# Patient Record
Sex: MALE | Race: WHITE | NOT HISPANIC OR LATINO | Employment: OTHER | URBAN - NONMETROPOLITAN AREA
[De-identification: names, ages, dates, MRNs, and addresses within clinical notes are randomized per-mention and may not be internally consistent; named-entity substitution may affect disease eponyms.]

---

## 2018-04-09 ENCOUNTER — HISTORICAL (OUTPATIENT)
Dept: ADMINISTRATIVE | Facility: HOSPITAL | Age: 58
End: 2018-04-09

## 2020-12-10 ENCOUNTER — OFFICE VISIT (OUTPATIENT)
Dept: UROLOGY | Facility: CLINIC | Age: 60
End: 2020-12-10
Payer: MEDICAID

## 2020-12-10 VITALS
SYSTOLIC BLOOD PRESSURE: 157 MMHG | DIASTOLIC BLOOD PRESSURE: 96 MMHG | WEIGHT: 188 LBS | BODY MASS INDEX: 26.92 KG/M2 | HEIGHT: 70 IN | HEART RATE: 53 BPM

## 2020-12-10 DIAGNOSIS — R35.1 NOCTURIA: ICD-10-CM

## 2020-12-10 DIAGNOSIS — N52.9 ERECTILE DYSFUNCTION, UNSPECIFIED ERECTILE DYSFUNCTION TYPE: Primary | ICD-10-CM

## 2020-12-10 DIAGNOSIS — R68.82 DECREASED LIBIDO: ICD-10-CM

## 2020-12-10 PROCEDURE — 99203 OFFICE O/P NEW LOW 30 MIN: CPT | Mod: S$GLB,,, | Performed by: UROLOGY

## 2020-12-10 PROCEDURE — 99203 PR OFFICE/OUTPT VISIT, NEW, LEVL III, 30-44 MIN: ICD-10-PCS | Mod: S$GLB,,, | Performed by: UROLOGY

## 2020-12-10 RX ORDER — BUSPIRONE HYDROCHLORIDE 5 MG/1
5 TABLET ORAL 2 TIMES DAILY
COMMUNITY
Start: 2020-11-14 | End: 2022-09-20

## 2020-12-10 RX ORDER — ROSUVASTATIN CALCIUM 10 MG/1
10 TABLET, COATED ORAL DAILY
Status: ON HOLD | COMMUNITY
Start: 2020-11-23 | End: 2022-06-13 | Stop reason: HOSPADM

## 2020-12-10 RX ORDER — SILDENAFIL 100 MG/1
100 TABLET, FILM COATED ORAL DAILY PRN
Qty: 30 TABLET | Refills: 11 | Status: SHIPPED | OUTPATIENT
Start: 2020-12-10 | End: 2022-09-20

## 2020-12-10 RX ORDER — NEOMYCIN SULFATE, POLYMYXIN B SULFATE, AND DEXAMETHASONE 3.5; 10000; 1 MG/G; [USP'U]/G; MG/G
OINTMENT OPHTHALMIC
Status: ON HOLD | COMMUNITY
Start: 2020-12-02 | End: 2022-06-13 | Stop reason: HOSPADM

## 2020-12-10 RX ORDER — METOPROLOL SUCCINATE 25 MG/1
TABLET, EXTENDED RELEASE ORAL
Status: ON HOLD | COMMUNITY
Start: 2020-12-09 | End: 2022-06-13 | Stop reason: HOSPADM

## 2020-12-10 NOTE — PROGRESS NOTES
Subjective:       Patient ID: Esteban Martinez is a 60 y.o. male.    Chief Complaint: Decreased Libido      HPI:  60-year-old male self-referred for erectile dysfunction, decreased libido he reports only getting partial erections and not able to penetrate.    Past Medical History:   Past Medical History:   Diagnosis Date    Anxiety     Hyperlipidemia     Hypertension        Past Surgical Historical:   Past Surgical History:   Procedure Laterality Date    CHOLECYSTECTOMY      SHOULDER SURGERY Left         Medications:   Medication List with Changes/Refills   Current Medications    BUSPIRONE (BUSPAR) 5 MG TAB    Take 5 mg by mouth 2 (two) times daily.    METOPROLOL SUCCINATE (TOPROL-XL) 25 MG 24 HR TABLET        NEOMYCIN-POLYMYXIN-DEXAMETHASONE (DEXACINE) 3.5 MG/G-10,000 UNIT/G-0.1 % OINT    APPLY A THIN LAYER OF OINTMENT TO BOTH UPPER AND LOWER EYELIDS TWICE DAILY FOR 7 DAYS THEN ONCE DAILY FOR 7 DAYS    ROSUVASTATIN (CRESTOR) 10 MG TABLET    Take 10 mg by mouth once daily.        Past Social History:   Social History     Socioeconomic History    Marital status:      Spouse name: Not on file    Number of children: Not on file    Years of education: Not on file    Highest education level: Not on file   Occupational History    Not on file   Social Needs    Financial resource strain: Not on file    Food insecurity     Worry: Not on file     Inability: Not on file    Transportation needs     Medical: Not on file     Non-medical: Not on file   Tobacco Use    Smoking status: Never Smoker   Substance and Sexual Activity    Alcohol use: Never     Frequency: Never    Drug use: Never    Sexual activity: Yes   Lifestyle    Physical activity     Days per week: Not on file     Minutes per session: Not on file    Stress: Not on file   Relationships    Social connections     Talks on phone: Not on file     Gets together: Not on file     Attends Restoration service: Not on file     Active member of club or  organization: Not on file     Attends meetings of clubs or organizations: Not on file     Relationship status: Not on file   Other Topics Concern    Not on file   Social History Narrative    Not on file       Allergies:   Review of patient's allergies indicates:   Allergen Reactions    Codeine     Tramadol     Darvocet a500 [propoxyphene n-acetaminophen]     Tylenol [acetaminophen]         Family History: History reviewed. No pertinent family history.     Review of Systems:  Review of Systems   Constitutional: Negative for activity change and appetite change.   HENT: Negative for congestion and dental problem.    Respiratory: Negative for chest tightness and shortness of breath.    Cardiovascular: Negative for chest pain.   Gastrointestinal: Negative for abdominal distention and abdominal pain.   Endocrine: Negative for polyuria.   Genitourinary: Negative for difficulty urinating, discharge, dysuria, flank pain, frequency, hematuria, penile pain, penile swelling, scrotal swelling, testicular pain and urgency.   Musculoskeletal: Negative for back pain and neck pain.   Allergic/Immunologic: Positive for food allergies.   Neurological: Negative for dizziness.   Hematological: Negative for adenopathy.   Psychiatric/Behavioral: Negative for agitation, behavioral problems and confusion.       Physical Exam:  Physical Exam  Constitutional:       Appearance: He is well-developed.   HENT:      Head: Normocephalic and atraumatic.   Eyes:      Pupils: Pupils are equal, round, and reactive to light.   Cardiovascular:      Rate and Rhythm: Normal rate and regular rhythm.      Heart sounds: Normal heart sounds.   Pulmonary:      Effort: Pulmonary effort is normal. No respiratory distress.      Breath sounds: Normal breath sounds. No wheezing or rales.   Abdominal:      General: Bowel sounds are normal. There is no distension.      Palpations: Abdomen is soft.      Tenderness: There is no abdominal tenderness. There is no  guarding or rebound.   Genitourinary:     Penis: Normal. No tenderness.       Prostate: Normal.   Musculoskeletal: Normal range of motion.         Assessment/Plan:       Problem List Items Addressed This Visit     None      Visit Diagnoses     Erectile dysfunction, unspecified erectile dysfunction type    -  Primary    Nocturia        Decreased libido                 Decreased libido:  We had a long bety discussion about erectile dysfunction and the negative experiencing feelings associated with a sexual encounter with a poor outcome and how this can cause a person to want to avoid repeating that situation and that often times if the erectile dysfunction his fixed the interest in sex will increased patient understood this and feels that this is his issue this point we will avoid checking testosterone as his energy level and general desire for sex have not truly decreased.    Erectile dysfunction:  We will prescribe Viagra to be taken as needed.    Nocturia:  Patient was encouraged to decrease fluid intake for 4 hr prior to bedtime

## 2021-09-23 ENCOUNTER — HISTORICAL (OUTPATIENT)
Dept: SURGERY | Facility: HOSPITAL | Age: 61
End: 2021-09-23

## 2021-09-23 ENCOUNTER — HISTORICAL (OUTPATIENT)
Dept: LAB | Facility: HOSPITAL | Age: 61
End: 2021-09-23

## 2021-09-23 LAB
ABS NEUT (OLG): 4.4 X10(3)/MCL (ref 1.5–6.9)
ALBUMIN SERPL-MCNC: 4.1 GM/DL (ref 3.4–4.8)
ALBUMIN/GLOB SERPL: 1 RATIO (ref 1.1–2)
ALP SERPL-CCNC: 99 UNIT/L (ref 40–150)
ALT SERPL-CCNC: 37 UNIT/L (ref 0–55)
AST SERPL-CCNC: 31 UNIT/L (ref 5–34)
BASOPHILS # BLD AUTO: 0.1 X10(3)/MCL (ref 0–0.1)
BASOPHILS NFR BLD AUTO: 1 % (ref 0–1)
BILIRUB SERPL-MCNC: 0.9 MG/DL
BILIRUBIN DIRECT+TOT PNL SERPL-MCNC: 0.3 MG/DL (ref 0–0.5)
BILIRUBIN DIRECT+TOT PNL SERPL-MCNC: 0.6 MG/DL (ref 0–0.8)
BUN SERPL-MCNC: 15 MG/DL (ref 8.4–25.7)
CALCIUM SERPL-MCNC: 9.6 MG/DL (ref 8.8–10)
CHLORIDE SERPL-SCNC: 105 MMOL/L (ref 98–107)
CO2 SERPL-SCNC: 28 MMOL/L (ref 23–31)
CREAT SERPL-MCNC: 1.38 MG/DL (ref 0.73–1.18)
EOSINOPHIL # BLD AUTO: 0.4 X10(3)/MCL (ref 0–0.6)
EOSINOPHIL NFR BLD AUTO: 4 % (ref 0–5)
ERYTHROCYTE [DISTWIDTH] IN BLOOD BY AUTOMATED COUNT: 13.5 % (ref 11.5–17)
GLOBULIN SER-MCNC: 4 GM/DL (ref 2.4–3.5)
GLUCOSE SERPL-MCNC: 85 MG/DL (ref 82–115)
HCT VFR BLD AUTO: 47.2 % (ref 42–52)
HGB BLD-MCNC: 15.1 GM/DL (ref 14–18)
IMM GRANULOCYTES # BLD AUTO: 0.02 10*3/UL (ref 0–0.02)
IMM GRANULOCYTES NFR BLD AUTO: 0.3 % (ref 0–0.43)
LYMPHOCYTES # BLD AUTO: 2.2 X10(3)/MCL (ref 0.5–4.1)
LYMPHOCYTES NFR BLD AUTO: 28 % (ref 15–40)
MAGNESIUM SERPL-MCNC: 2 MG/DL (ref 1.6–2.6)
MCH RBC QN AUTO: 29 PG (ref 27–34)
MCHC RBC AUTO-ENTMCNC: 32 GM/DL (ref 31–36)
MCV RBC AUTO: 91 FL (ref 80–99)
MONOCYTES # BLD AUTO: 0.9 X10(3)/MCL (ref 0–1.1)
MONOCYTES NFR BLD AUTO: 11 % (ref 4–12)
NEUTROPHILS # BLD AUTO: 4.4 X10(3)/MCL (ref 1.5–6.9)
NEUTROPHILS NFR BLD AUTO: 56 % (ref 43–75)
PLATELET # BLD AUTO: 219 X10(3)/MCL (ref 140–400)
PMV BLD AUTO: 10.8 FL (ref 6.8–10)
POTASSIUM SERPL-SCNC: 4.5 MMOL/L (ref 3.5–5.1)
PROT SERPL-MCNC: 8.1 GM/DL (ref 5.8–7.6)
RBC # BLD AUTO: 5.19 X10(6)/MCL (ref 4.7–6.1)
SARS-COV-2 AG RESP QL IA.RAPID: NOT DETECTED
SODIUM SERPL-SCNC: 142 MMOL/L (ref 136–145)
WBC # SPEC AUTO: 8 X10(3)/MCL (ref 4.5–11.5)

## 2021-09-27 ENCOUNTER — HISTORICAL (OUTPATIENT)
Dept: ANESTHESIOLOGY | Facility: HOSPITAL | Age: 61
End: 2021-09-27

## 2021-09-27 ENCOUNTER — HISTORICAL (OUTPATIENT)
Dept: INFUSION THERAPY | Facility: HOSPITAL | Age: 61
End: 2021-09-27

## 2021-09-28 ENCOUNTER — HISTORICAL (OUTPATIENT)
Dept: INFUSION THERAPY | Facility: HOSPITAL | Age: 61
End: 2021-09-28

## 2021-09-30 ENCOUNTER — HISTORICAL (OUTPATIENT)
Dept: INFUSION THERAPY | Facility: HOSPITAL | Age: 61
End: 2021-09-30

## 2021-10-04 ENCOUNTER — HISTORICAL (OUTPATIENT)
Dept: LAB | Facility: HOSPITAL | Age: 61
End: 2021-10-04

## 2021-10-04 LAB
ABS NEUT (OLG): 6.7 X10(3)/MCL (ref 1.5–6.9)
ALBUMIN SERPL-MCNC: 4.1 GM/DL (ref 3.4–4.8)
ALBUMIN/GLOB SERPL: 1.1 RATIO (ref 1.1–2)
ALP SERPL-CCNC: 93 UNIT/L (ref 40–150)
ALT SERPL-CCNC: 34 UNIT/L (ref 0–55)
AST SERPL-CCNC: 24 UNIT/L (ref 5–34)
BASOPHILS # BLD AUTO: 0.1 X10(3)/MCL (ref 0–0.1)
BASOPHILS NFR BLD AUTO: 1 % (ref 0–1)
BILIRUB SERPL-MCNC: 1.6 MG/DL
BILIRUBIN DIRECT+TOT PNL SERPL-MCNC: 0.5 MG/DL (ref 0–0.5)
BILIRUBIN DIRECT+TOT PNL SERPL-MCNC: 1.1 MG/DL (ref 0–0.8)
BUN SERPL-MCNC: 18 MG/DL (ref 8.4–25.7)
CALCIUM SERPL-MCNC: 9.8 MG/DL (ref 8.8–10)
CHLORIDE SERPL-SCNC: 104 MMOL/L (ref 98–107)
CO2 SERPL-SCNC: 22 MMOL/L (ref 23–31)
CREAT SERPL-MCNC: 1.41 MG/DL (ref 0.73–1.18)
EOSINOPHIL # BLD AUTO: 0.2 X10(3)/MCL (ref 0–0.6)
EOSINOPHIL NFR BLD AUTO: 3 % (ref 0–5)
ERYTHROCYTE [DISTWIDTH] IN BLOOD BY AUTOMATED COUNT: 13.3 % (ref 11.5–17)
GLOBULIN SER-MCNC: 3.9 GM/DL (ref 2.4–3.5)
GLUCOSE SERPL-MCNC: 129 MG/DL (ref 82–115)
HCT VFR BLD AUTO: 46.7 % (ref 42–52)
HGB BLD-MCNC: 15.6 GM/DL (ref 14–18)
IMM GRANULOCYTES # BLD AUTO: 0.05 10*3/UL (ref 0–0.02)
IMM GRANULOCYTES NFR BLD AUTO: 0.5 % (ref 0–0.43)
LYMPHOCYTES # BLD AUTO: 1.2 X10(3)/MCL (ref 0.5–4.1)
LYMPHOCYTES NFR BLD AUTO: 13 % (ref 15–40)
MCH RBC QN AUTO: 29 PG (ref 27–34)
MCHC RBC AUTO-ENTMCNC: 33 GM/DL (ref 31–36)
MCV RBC AUTO: 88 FL (ref 80–99)
MONOCYTES # BLD AUTO: 0.8 X10(3)/MCL (ref 0–1.1)
MONOCYTES NFR BLD AUTO: 9 % (ref 4–12)
NEUTROPHILS # BLD AUTO: 6.7 X10(3)/MCL (ref 1.5–6.9)
NEUTROPHILS NFR BLD AUTO: 73 % (ref 43–75)
PLATELET # BLD AUTO: 217 X10(3)/MCL (ref 140–400)
PMV BLD AUTO: 10.4 FL (ref 6.8–10)
POTASSIUM SERPL-SCNC: 3.6 MMOL/L (ref 3.5–5.1)
PROT SERPL-MCNC: 8 GM/DL (ref 5.8–7.6)
RBC # BLD AUTO: 5.3 X10(6)/MCL (ref 4.7–6.1)
SODIUM SERPL-SCNC: 137 MMOL/L (ref 136–145)
WBC # SPEC AUTO: 9.1 X10(3)/MCL (ref 4.5–11.5)

## 2021-10-05 ENCOUNTER — HISTORICAL (OUTPATIENT)
Dept: INFUSION THERAPY | Facility: HOSPITAL | Age: 61
End: 2021-10-05

## 2021-10-07 ENCOUNTER — HISTORICAL (OUTPATIENT)
Dept: INFUSION THERAPY | Facility: HOSPITAL | Age: 61
End: 2021-10-07

## 2021-10-08 ENCOUNTER — HISTORICAL (OUTPATIENT)
Dept: INFUSION THERAPY | Facility: HOSPITAL | Age: 61
End: 2021-10-08

## 2021-10-11 ENCOUNTER — HISTORICAL (OUTPATIENT)
Dept: ADMINISTRATIVE | Facility: HOSPITAL | Age: 61
End: 2021-10-11

## 2021-10-11 ENCOUNTER — HISTORICAL (OUTPATIENT)
Dept: LAB | Facility: HOSPITAL | Age: 61
End: 2021-10-11

## 2021-10-11 LAB
ABS NEUT (OLG): 6.6 X10(3)/MCL (ref 1.5–6.9)
ALBUMIN SERPL-MCNC: 4 GM/DL (ref 3.4–4.8)
ALBUMIN/GLOB SERPL: 1.1 RATIO (ref 1.1–2)
ALP SERPL-CCNC: 91 UNIT/L (ref 40–150)
ALT SERPL-CCNC: 43 UNIT/L (ref 0–55)
AST SERPL-CCNC: 31 UNIT/L (ref 5–34)
BASOPHILS # BLD AUTO: 0.1 X10(3)/MCL (ref 0–0.1)
BASOPHILS NFR BLD AUTO: 1 % (ref 0–1)
BILIRUB SERPL-MCNC: 1.1 MG/DL
BILIRUBIN DIRECT+TOT PNL SERPL-MCNC: 0.4 MG/DL (ref 0–0.5)
BILIRUBIN DIRECT+TOT PNL SERPL-MCNC: 0.7 MG/DL (ref 0–0.8)
BUN SERPL-MCNC: 18 MG/DL (ref 8.4–25.7)
CALCIUM SERPL-MCNC: 9.7 MG/DL (ref 8.8–10)
CHLORIDE SERPL-SCNC: 107 MMOL/L (ref 98–107)
CO2 SERPL-SCNC: 25 MMOL/L (ref 23–31)
CREAT SERPL-MCNC: 1.2 MG/DL (ref 0.73–1.18)
EOSINOPHIL # BLD AUTO: 0.2 X10(3)/MCL (ref 0–0.6)
EOSINOPHIL NFR BLD AUTO: 2 % (ref 0–5)
ERYTHROCYTE [DISTWIDTH] IN BLOOD BY AUTOMATED COUNT: 13.6 % (ref 11.5–17)
GLOBULIN SER-MCNC: 3.6 GM/DL (ref 2.4–3.5)
GLUCOSE SERPL-MCNC: 93 MG/DL (ref 82–115)
HCT VFR BLD AUTO: 42.2 % (ref 42–52)
HGB BLD-MCNC: 13.7 GM/DL (ref 14–18)
IMM GRANULOCYTES # BLD AUTO: 0.02 10*3/UL (ref 0–0.02)
IMM GRANULOCYTES NFR BLD AUTO: 0.2 % (ref 0–0.43)
LYMPHOCYTES # BLD AUTO: 0.8 X10(3)/MCL (ref 0.5–4.1)
LYMPHOCYTES NFR BLD AUTO: 10 % (ref 15–40)
MAGNESIUM SERPL-MCNC: 1.9 MG/DL (ref 1.6–2.6)
MCH RBC QN AUTO: 29 PG (ref 27–34)
MCHC RBC AUTO-ENTMCNC: 32 GM/DL (ref 31–36)
MCV RBC AUTO: 90 FL (ref 80–99)
MONOCYTES # BLD AUTO: 0.8 X10(3)/MCL (ref 0–1.1)
MONOCYTES NFR BLD AUTO: 10 % (ref 4–12)
NEUTROPHILS # BLD AUTO: 6.6 X10(3)/MCL (ref 1.5–6.9)
NEUTROPHILS NFR BLD AUTO: 78 % (ref 43–75)
PLATELET # BLD AUTO: 168 X10(3)/MCL (ref 140–400)
PMV BLD AUTO: 10.7 FL (ref 6.8–10)
POTASSIUM SERPL-SCNC: 3.7 MMOL/L (ref 3.5–5.1)
PROT SERPL-MCNC: 7.6 GM/DL (ref 5.8–7.6)
RBC # BLD AUTO: 4.68 X10(6)/MCL (ref 4.7–6.1)
SODIUM SERPL-SCNC: 141 MMOL/L (ref 136–145)
WBC # SPEC AUTO: 8.5 X10(3)/MCL (ref 4.5–11.5)

## 2021-10-12 ENCOUNTER — HISTORICAL (OUTPATIENT)
Dept: INFUSION THERAPY | Facility: HOSPITAL | Age: 61
End: 2021-10-12

## 2021-10-15 ENCOUNTER — HISTORICAL (OUTPATIENT)
Dept: INFUSION THERAPY | Facility: HOSPITAL | Age: 61
End: 2021-10-15

## 2021-10-18 ENCOUNTER — HISTORICAL (OUTPATIENT)
Dept: INFUSION THERAPY | Facility: HOSPITAL | Age: 61
End: 2021-10-18

## 2021-10-18 LAB
ABS NEUT (OLG): 3.9 X10(3)/MCL (ref 1.5–6.9)
ALBUMIN SERPL-MCNC: 4 GM/DL (ref 3.4–4.8)
ALBUMIN/GLOB SERPL: 1.1 RATIO (ref 1.1–2)
ALP SERPL-CCNC: 91 UNIT/L (ref 40–150)
ALT SERPL-CCNC: 49 UNIT/L (ref 0–55)
AST SERPL-CCNC: 35 UNIT/L (ref 5–34)
BASOPHILS # BLD AUTO: 0 X10(3)/MCL (ref 0–0.1)
BASOPHILS NFR BLD AUTO: 0 % (ref 0–1)
BILIRUB SERPL-MCNC: 1 MG/DL
BILIRUBIN DIRECT+TOT PNL SERPL-MCNC: 0.4 MG/DL (ref 0–0.5)
BILIRUBIN DIRECT+TOT PNL SERPL-MCNC: 0.6 MG/DL (ref 0–0.8)
BUN SERPL-MCNC: 18 MG/DL (ref 8.4–25.7)
CALCIUM SERPL-MCNC: 9.5 MG/DL (ref 8.8–10)
CHLORIDE SERPL-SCNC: 103 MMOL/L (ref 98–107)
CO2 SERPL-SCNC: 24 MMOL/L (ref 23–31)
CREAT SERPL-MCNC: 1.26 MG/DL (ref 0.73–1.18)
EOSINOPHIL # BLD AUTO: 0.1 X10(3)/MCL (ref 0–0.6)
EOSINOPHIL NFR BLD AUTO: 2 % (ref 0–5)
ERYTHROCYTE [DISTWIDTH] IN BLOOD BY AUTOMATED COUNT: 13.5 % (ref 11.5–17)
GLOBULIN SER-MCNC: 3.6 GM/DL (ref 2.4–3.5)
GLUCOSE SERPL-MCNC: 103 MG/DL (ref 82–115)
HCT VFR BLD AUTO: 44.7 % (ref 42–52)
HGB BLD-MCNC: 14.7 GM/DL (ref 14–18)
IMM GRANULOCYTES # BLD AUTO: 0.02 10*3/UL (ref 0–0.02)
IMM GRANULOCYTES NFR BLD AUTO: 0.4 % (ref 0–0.43)
LYMPHOCYTES # BLD AUTO: 0.4 X10(3)/MCL (ref 0.5–4.1)
LYMPHOCYTES NFR BLD AUTO: 8 % (ref 15–40)
MAGNESIUM SERPL-MCNC: 1.8 MG/DL (ref 1.6–2.6)
MCH RBC QN AUTO: 30 PG (ref 27–34)
MCHC RBC AUTO-ENTMCNC: 33 GM/DL (ref 31–36)
MCV RBC AUTO: 90 FL (ref 80–99)
MONOCYTES # BLD AUTO: 0.5 X10(3)/MCL (ref 0–1.1)
MONOCYTES NFR BLD AUTO: 10 % (ref 4–12)
NEUTROPHILS # BLD AUTO: 3.9 X10(3)/MCL (ref 1.5–6.9)
NEUTROPHILS NFR BLD AUTO: 80 % (ref 43–75)
PLATELET # BLD AUTO: 122 X10(3)/MCL (ref 140–400)
PMV BLD AUTO: 10.2 FL (ref 6.8–10)
POTASSIUM SERPL-SCNC: 4 MMOL/L (ref 3.5–5.1)
PROT SERPL-MCNC: 7.6 GM/DL (ref 5.8–7.6)
RBC # BLD AUTO: 4.94 X10(6)/MCL (ref 4.7–6.1)
SODIUM SERPL-SCNC: 139 MMOL/L (ref 136–145)
WBC # SPEC AUTO: 4.8 X10(3)/MCL (ref 4.5–11.5)

## 2021-10-25 ENCOUNTER — HISTORICAL (OUTPATIENT)
Dept: LAB | Facility: HOSPITAL | Age: 61
End: 2021-10-25

## 2021-10-25 LAB
ABS NEUT (OLG): 2.2 X10(3)/MCL (ref 1.5–6.9)
ALBUMIN SERPL-MCNC: 3.9 GM/DL (ref 3.4–4.8)
ALBUMIN/GLOB SERPL: 1.1 RATIO (ref 1.1–2)
ALP SERPL-CCNC: 78 UNIT/L (ref 40–150)
ALT SERPL-CCNC: 35 UNIT/L (ref 0–55)
ANISOCYTOSIS BLD QL SMEAR: ABNORMAL
AST SERPL-CCNC: 30 UNIT/L (ref 5–34)
BASOPHILS NFR BLD MANUAL: 0 % (ref 0–1)
BILIRUB SERPL-MCNC: 0.6 MG/DL
BILIRUBIN DIRECT+TOT PNL SERPL-MCNC: 0.2 MG/DL (ref 0–0.5)
BILIRUBIN DIRECT+TOT PNL SERPL-MCNC: 0.4 MG/DL (ref 0–0.8)
BUN SERPL-MCNC: 16 MG/DL (ref 8.4–25.7)
CALCIUM SERPL-MCNC: 9.5 MG/DL (ref 8.7–10.5)
CHLORIDE SERPL-SCNC: 102 MMOL/L (ref 98–107)
CO2 SERPL-SCNC: 28 MMOL/L (ref 23–31)
CREAT SERPL-MCNC: 1.19 MG/DL (ref 0.73–1.18)
EOSINOPHIL NFR BLD MANUAL: 3 % (ref 0–5)
ERYTHROCYTE [DISTWIDTH] IN BLOOD BY AUTOMATED COUNT: 14.3 % (ref 11.5–17)
GLOBULIN SER-MCNC: 3.6 GM/DL (ref 2.4–3.5)
GLUCOSE SERPL-MCNC: 85 MG/DL (ref 82–115)
GRANULOCYTES NFR BLD MANUAL: 71 % (ref 47–80)
HCT VFR BLD AUTO: 38.1 % (ref 42–52)
HGB BLD-MCNC: 12.4 GM/DL (ref 14–18)
HYPOCHROMIA BLD QL SMEAR: ABNORMAL
LYMPHOCYTES NFR BLD MANUAL: 17 % (ref 15–40)
MAGNESIUM SERPL-MCNC: 1.8 MG/DL (ref 1.6–2.6)
MCH RBC QN AUTO: 29 PG (ref 27–34)
MCHC RBC AUTO-ENTMCNC: 32 GM/DL (ref 31–36)
MCV RBC AUTO: 89 FL (ref 80–99)
MONOCYTES NFR BLD MANUAL: 7 % (ref 4–12)
NEUTS BAND NFR BLD MANUAL: 2 % (ref 1–5)
PLATELET # BLD AUTO: 99 X10(3)/MCL (ref 140–400)
PLATELET # BLD EST: ABNORMAL 10*3/UL
PMV BLD AUTO: 10.5 FL (ref 6.8–10)
POTASSIUM SERPL-SCNC: 3.7 MMOL/L (ref 3.5–5.1)
PROT SERPL-MCNC: 7.5 GM/DL (ref 5.8–7.6)
RBC # BLD AUTO: 4.3 X10(6)/MCL (ref 4.7–6.1)
RBC MORPH BLD: ABNORMAL
SODIUM SERPL-SCNC: 141 MMOL/L (ref 136–145)
WBC # SPEC AUTO: 3.3 X10(3)/MCL (ref 4.5–11.5)

## 2021-10-26 ENCOUNTER — HISTORICAL (OUTPATIENT)
Dept: INFUSION THERAPY | Facility: HOSPITAL | Age: 61
End: 2021-10-26

## 2021-10-28 ENCOUNTER — HISTORICAL (OUTPATIENT)
Dept: INFUSION THERAPY | Facility: HOSPITAL | Age: 61
End: 2021-10-28

## 2021-10-29 ENCOUNTER — HISTORICAL (OUTPATIENT)
Dept: INFUSION THERAPY | Facility: HOSPITAL | Age: 61
End: 2021-10-29

## 2021-10-29 LAB
ABS NEUT (OLG): 2.4 X10(3)/MCL (ref 1.5–6.9)
BASOPHILS NFR BLD MANUAL: 0 % (ref 0–1)
EOSINOPHIL NFR BLD MANUAL: 2 % (ref 0–5)
ERYTHROCYTE [DISTWIDTH] IN BLOOD BY AUTOMATED COUNT: 14.3 % (ref 11.5–17)
GRANULOCYTES NFR BLD MANUAL: 78 % (ref 47–80)
HCT VFR BLD AUTO: 36.9 % (ref 42–52)
HGB BLD-MCNC: 12.1 GM/DL (ref 14–18)
LYMPHOCYTES NFR BLD MANUAL: 9 % (ref 15–40)
MCH RBC QN AUTO: 30 PG (ref 27–34)
MCHC RBC AUTO-ENTMCNC: 33 GM/DL (ref 31–36)
MCV RBC AUTO: 92 FL (ref 80–99)
MONOCYTES NFR BLD MANUAL: 11 % (ref 4–12)
PLATELET # BLD AUTO: 96 X10(3)/MCL (ref 140–400)
PLATELET # BLD EST: ABNORMAL 10*3/UL
PMV BLD AUTO: 10.5 FL (ref 6.8–10)
RBC # BLD AUTO: 4.03 X10(6)/MCL (ref 4.7–6.1)
RBC MORPH BLD: NORMAL
WBC # SPEC AUTO: 3.1 X10(3)/MCL (ref 4.5–11.5)

## 2021-11-01 ENCOUNTER — HISTORICAL (OUTPATIENT)
Dept: LAB | Facility: HOSPITAL | Age: 61
End: 2021-11-01

## 2021-11-01 LAB
ABS NEUT (OLG): 1.5 X10(3)/MCL (ref 1.5–6.9)
ALBUMIN SERPL-MCNC: 3.8 GM/DL (ref 3.4–4.8)
ALBUMIN/GLOB SERPL: 1.2 RATIO (ref 1.1–2)
ALP SERPL-CCNC: 77 UNIT/L (ref 40–150)
ALT SERPL-CCNC: 68 UNIT/L (ref 0–55)
AST SERPL-CCNC: 50 UNIT/L (ref 5–34)
BASOPHILS NFR BLD MANUAL: 0 % (ref 0–1)
BILIRUB SERPL-MCNC: 0.6 MG/DL
BILIRUBIN DIRECT+TOT PNL SERPL-MCNC: 0.2 MG/DL (ref 0–0.5)
BILIRUBIN DIRECT+TOT PNL SERPL-MCNC: 0.4 MG/DL (ref 0–0.8)
BUN SERPL-MCNC: 12 MG/DL (ref 8.4–25.7)
CALCIUM SERPL-MCNC: 9.4 MG/DL (ref 8.7–10.5)
CHLORIDE SERPL-SCNC: 103 MMOL/L (ref 98–107)
CO2 SERPL-SCNC: 26 MMOL/L (ref 23–31)
CREAT SERPL-MCNC: 1.21 MG/DL (ref 0.73–1.18)
EOSINOPHIL NFR BLD MANUAL: 3 % (ref 0–5)
ERYTHROCYTE [DISTWIDTH] IN BLOOD BY AUTOMATED COUNT: 14.5 % (ref 11.5–17)
GLOBULIN SER-MCNC: 3.2 GM/DL (ref 2.4–3.5)
GLUCOSE SERPL-MCNC: 83 MG/DL (ref 82–115)
GRANULOCYTES NFR BLD MANUAL: 67 % (ref 47–80)
HCT VFR BLD AUTO: 35.7 % (ref 42–52)
HGB BLD-MCNC: 12.2 GM/DL (ref 14–18)
LYMPHOCYTES NFR BLD MANUAL: 14 % (ref 15–40)
MAGNESIUM SERPL-MCNC: 1.7 MG/DL (ref 1.6–2.6)
MCH RBC QN AUTO: 31 PG (ref 27–34)
MCHC RBC AUTO-ENTMCNC: 34 GM/DL (ref 31–36)
MCV RBC AUTO: 90 FL (ref 80–99)
MONOCYTES NFR BLD MANUAL: 16 % (ref 4–12)
PLATELET # BLD AUTO: 111 X10(3)/MCL (ref 140–400)
PLATELET # BLD EST: ABNORMAL 10*3/UL
PMV BLD AUTO: 9.4 FL (ref 6.8–10)
POTASSIUM SERPL-SCNC: 4.1 MMOL/L (ref 3.5–5.1)
PROT SERPL-MCNC: 7 GM/DL (ref 5.8–7.6)
RBC # BLD AUTO: 3.99 X10(6)/MCL (ref 4.7–6.1)
RBC MORPH BLD: NORMAL
SODIUM SERPL-SCNC: 138 MMOL/L (ref 136–145)
WBC # SPEC AUTO: 2.4 X10(3)/MCL (ref 4.5–11.5)

## 2021-11-03 ENCOUNTER — HISTORICAL (OUTPATIENT)
Dept: INFUSION THERAPY | Facility: HOSPITAL | Age: 61
End: 2021-11-03

## 2021-11-05 ENCOUNTER — HISTORICAL (OUTPATIENT)
Dept: INFUSION THERAPY | Facility: HOSPITAL | Age: 61
End: 2021-11-05

## 2021-11-08 ENCOUNTER — HISTORICAL (OUTPATIENT)
Dept: INFUSION THERAPY | Facility: HOSPITAL | Age: 61
End: 2021-11-08

## 2021-11-09 ENCOUNTER — HISTORICAL (OUTPATIENT)
Dept: LAB | Facility: HOSPITAL | Age: 61
End: 2021-11-09

## 2021-11-09 LAB
ABS NEUT (OLG): 2 X10(3)/MCL (ref 1.5–6.9)
ALBUMIN SERPL-MCNC: 3.8 GM/DL (ref 3.4–4.8)
ALBUMIN/GLOB SERPL: 1.1 RATIO (ref 1.1–2)
ALP SERPL-CCNC: 75 UNIT/L (ref 40–150)
ALT SERPL-CCNC: 56 UNIT/L (ref 0–55)
AST SERPL-CCNC: 32 UNIT/L (ref 5–34)
BASOPHILS NFR BLD MANUAL: 0 % (ref 0–1)
BILIRUB SERPL-MCNC: 0.5 MG/DL
BILIRUBIN DIRECT+TOT PNL SERPL-MCNC: 0.2 MG/DL (ref 0–0.5)
BILIRUBIN DIRECT+TOT PNL SERPL-MCNC: 0.3 MG/DL (ref 0–0.8)
BUN SERPL-MCNC: 16 MG/DL (ref 8.4–25.7)
CALCIUM SERPL-MCNC: 9.2 MG/DL (ref 8.7–10.5)
CHLORIDE SERPL-SCNC: 101 MMOL/L (ref 98–107)
CO2 SERPL-SCNC: 26 MMOL/L (ref 23–31)
CREAT SERPL-MCNC: 1.11 MG/DL (ref 0.73–1.18)
EOSINOPHIL NFR BLD MANUAL: 3 % (ref 0–5)
ERYTHROCYTE [DISTWIDTH] IN BLOOD BY AUTOMATED COUNT: 15.4 % (ref 11.5–17)
GLOBULIN SER-MCNC: 3.4 GM/DL (ref 2.4–3.5)
GLUCOSE SERPL-MCNC: 104 MG/DL (ref 82–115)
GRANULOCYTES NFR BLD MANUAL: 74 % (ref 47–80)
HCT VFR BLD AUTO: 34.2 % (ref 42–52)
HGB BLD-MCNC: 11.4 GM/DL (ref 14–18)
LYMPHOCYTES NFR BLD MANUAL: 6 % (ref 15–40)
MAGNESIUM SERPL-MCNC: 1.4 MG/DL (ref 1.6–2.6)
MCH RBC QN AUTO: 30 PG (ref 27–34)
MCHC RBC AUTO-ENTMCNC: 33 GM/DL (ref 31–36)
MCV RBC AUTO: 90 FL (ref 80–99)
MONOCYTES NFR BLD MANUAL: 16 % (ref 4–12)
NEUTS BAND NFR BLD MANUAL: 1 % (ref 1–5)
PLATELET # BLD AUTO: 186 X10(3)/MCL (ref 140–400)
PLATELET # BLD EST: ADEQUATE 10*3/UL
PMV BLD AUTO: 10.2 FL (ref 6.8–10)
POTASSIUM SERPL-SCNC: 4.2 MMOL/L (ref 3.5–5.1)
PROT SERPL-MCNC: 7.2 GM/DL (ref 5.8–7.6)
RBC # BLD AUTO: 3.8 X10(6)/MCL (ref 4.7–6.1)
SODIUM SERPL-SCNC: 137 MMOL/L (ref 136–145)
WBC # SPEC AUTO: 2.8 X10(3)/MCL (ref 4.5–11.5)

## 2021-11-10 ENCOUNTER — HISTORICAL (OUTPATIENT)
Dept: INFUSION THERAPY | Facility: HOSPITAL | Age: 61
End: 2021-11-10

## 2021-11-12 ENCOUNTER — HISTORICAL (OUTPATIENT)
Dept: INFUSION THERAPY | Facility: HOSPITAL | Age: 61
End: 2021-11-12

## 2021-11-22 ENCOUNTER — HISTORICAL (OUTPATIENT)
Dept: LAB | Facility: HOSPITAL | Age: 61
End: 2021-11-22

## 2021-11-22 LAB
ABS NEUT (OLG): 2.4 X10(3)/MCL (ref 1.5–6.9)
ALBUMIN SERPL-MCNC: 3.6 GM/DL (ref 3.4–4.8)
ALBUMIN/GLOB SERPL: 1.2 RATIO (ref 1.1–2)
ALP SERPL-CCNC: 71 UNIT/L (ref 40–150)
ALT SERPL-CCNC: 38 UNIT/L (ref 0–55)
AST SERPL-CCNC: 29 UNIT/L (ref 5–34)
BASOPHILS NFR BLD MANUAL: 0 % (ref 0–1)
BILIRUB SERPL-MCNC: 0.6 MG/DL
BILIRUBIN DIRECT+TOT PNL SERPL-MCNC: 0.2 MG/DL (ref 0–0.5)
BILIRUBIN DIRECT+TOT PNL SERPL-MCNC: 0.4 MG/DL (ref 0–0.8)
BUN SERPL-MCNC: 29 MG/DL (ref 8.4–25.7)
CALCIUM SERPL-MCNC: 8.8 MG/DL (ref 8.7–10.5)
CHLORIDE SERPL-SCNC: 103 MMOL/L (ref 98–107)
CO2 SERPL-SCNC: 29 MMOL/L (ref 23–31)
CREAT SERPL-MCNC: 1.42 MG/DL (ref 0.73–1.18)
EOSINOPHIL NFR BLD MANUAL: 3 % (ref 0–5)
ERYTHROCYTE [DISTWIDTH] IN BLOOD BY AUTOMATED COUNT: 16.7 % (ref 11.5–17)
GLOBULIN SER-MCNC: 3 GM/DL (ref 2.4–3.5)
GLUCOSE SERPL-MCNC: 111 MG/DL (ref 82–115)
GRANULOCYTES NFR BLD MANUAL: 77 % (ref 47–80)
HCT VFR BLD AUTO: 28.3 % (ref 42–52)
HGB BLD-MCNC: 9.3 GM/DL (ref 14–18)
LYMPHOCYTES NFR BLD MANUAL: 10 % (ref 15–40)
MAGNESIUM SERPL-MCNC: 1.6 MG/DL (ref 1.6–2.6)
MCH RBC QN AUTO: 30 PG (ref 27–34)
MCHC RBC AUTO-ENTMCNC: 33 GM/DL (ref 31–36)
MCV RBC AUTO: 91 FL (ref 80–99)
MONOCYTES NFR BLD MANUAL: 10 % (ref 4–12)
PLATELET # BLD AUTO: 91 X10(3)/MCL (ref 140–400)
PLATELET # BLD EST: ABNORMAL 10*3/UL
PMV BLD AUTO: 10 FL (ref 6.8–10)
POTASSIUM SERPL-SCNC: 3.8 MMOL/L (ref 3.5–5.1)
PROT SERPL-MCNC: 6.6 GM/DL (ref 5.8–7.6)
RBC # BLD AUTO: 3.1 X10(6)/MCL (ref 4.7–6.1)
SODIUM SERPL-SCNC: 141 MMOL/L (ref 136–145)
WBC # SPEC AUTO: 3.2 X10(3)/MCL (ref 4.5–11.5)

## 2021-11-23 ENCOUNTER — HISTORICAL (OUTPATIENT)
Dept: INFUSION THERAPY | Facility: HOSPITAL | Age: 61
End: 2021-11-23

## 2021-12-07 ENCOUNTER — HISTORICAL (OUTPATIENT)
Dept: LAB | Facility: HOSPITAL | Age: 61
End: 2021-12-07

## 2021-12-07 LAB
ABS NEUT (OLG): 2.2 X10(3)/MCL (ref 1.5–6.9)
ALBUMIN SERPL-MCNC: 3.7 GM/DL (ref 3.4–4.8)
ALBUMIN/GLOB SERPL: 1.2 RATIO (ref 1.1–2)
ALP SERPL-CCNC: 86 UNIT/L (ref 40–150)
ALT SERPL-CCNC: 37 UNIT/L (ref 0–55)
AST SERPL-CCNC: 32 UNIT/L (ref 5–34)
BILIRUB SERPL-MCNC: 0.5 MG/DL
BILIRUBIN DIRECT+TOT PNL SERPL-MCNC: 0.1 MG/DL (ref 0–0.5)
BILIRUBIN DIRECT+TOT PNL SERPL-MCNC: 0.4 MG/DL (ref 0–0.8)
BUN SERPL-MCNC: 34 MG/DL (ref 8.4–25.7)
CALCIUM SERPL-MCNC: 9.6 MG/DL (ref 8.7–10.5)
CHLORIDE SERPL-SCNC: 102 MMOL/L (ref 98–107)
CO2 SERPL-SCNC: 31 MMOL/L (ref 23–31)
CREAT SERPL-MCNC: 1.36 MG/DL (ref 0.73–1.18)
EOSINOPHIL NFR BLD MANUAL: 5 % (ref 0–5)
ERYTHROCYTE [DISTWIDTH] IN BLOOD BY AUTOMATED COUNT: 19.7 % (ref 11.5–17)
GLOBULIN SER-MCNC: 3 GM/DL (ref 2.4–3.5)
GLUCOSE SERPL-MCNC: 150 MG/DL (ref 82–115)
HCT VFR BLD AUTO: 29.4 % (ref 42–52)
HGB BLD-MCNC: 9.8 GM/DL (ref 14–18)
LYMPHOCYTES NFR BLD MANUAL: 25 % (ref 15–40)
MAGNESIUM SERPL-MCNC: 2 MG/DL (ref 1.6–2.6)
MCH RBC QN AUTO: 32 PG (ref 27–34)
MCHC RBC AUTO-ENTMCNC: 33 GM/DL (ref 31–36)
MCV RBC AUTO: 96 FL (ref 80–99)
MONOCYTES NFR BLD MANUAL: 3 % (ref 4–12)
NEUTROPHILS NFR BLD MANUAL: 67 % (ref 47–80)
PLATELET # BLD AUTO: 171 X10(3)/MCL (ref 140–400)
PLATELET # BLD EST: ADEQUATE 10*3/UL
PMV BLD AUTO: 10.3 FL (ref 6.8–10)
POTASSIUM SERPL-SCNC: 4.4 MMOL/L (ref 3.5–5.1)
PROT SERPL-MCNC: 6.7 GM/DL (ref 5.8–7.6)
RBC # BLD AUTO: 3.08 X10(6)/MCL (ref 4.7–6.1)
RBC MORPH BLD: NORMAL
SODIUM SERPL-SCNC: 141 MMOL/L (ref 136–145)
WBC # SPEC AUTO: 3.2 X10(3)/MCL (ref 4.5–11.5)

## 2021-12-08 ENCOUNTER — HISTORICAL (OUTPATIENT)
Dept: INFUSION THERAPY | Facility: HOSPITAL | Age: 61
End: 2021-12-08

## 2022-01-05 ENCOUNTER — HISTORICAL (OUTPATIENT)
Dept: LAB | Facility: HOSPITAL | Age: 62
End: 2022-01-05

## 2022-01-05 LAB
ABS NEUT (OLG): 3.6 X10(3)/MCL (ref 1.5–6.9)
ALBUMIN SERPL-MCNC: 3.9 GM/DL (ref 3.4–4.8)
ALBUMIN/GLOB SERPL: 1.1 RATIO (ref 1.1–2)
ALP SERPL-CCNC: 98 UNIT/L (ref 40–150)
ALT SERPL-CCNC: 22 UNIT/L (ref 0–55)
AST SERPL-CCNC: 21 UNIT/L (ref 5–34)
BASOPHILS # BLD AUTO: 0 X10(3)/MCL (ref 0–0.1)
BASOPHILS NFR BLD AUTO: 1 % (ref 0–1)
BILIRUB SERPL-MCNC: 0.7 MG/DL
BILIRUBIN DIRECT+TOT PNL SERPL-MCNC: 0.2 MG/DL (ref 0–0.5)
BILIRUBIN DIRECT+TOT PNL SERPL-MCNC: 0.5 MG/DL (ref 0–0.8)
BUN SERPL-MCNC: 35 MG/DL (ref 8.4–25.7)
CALCIUM SERPL-MCNC: 9.8 MG/DL (ref 8.7–10.5)
CHLORIDE SERPL-SCNC: 105 MMOL/L (ref 98–107)
CO2 SERPL-SCNC: 29 MMOL/L (ref 23–31)
CREAT SERPL-MCNC: 1.39 MG/DL (ref 0.73–1.18)
EOSINOPHIL # BLD AUTO: 0.2 X10(3)/MCL (ref 0–0.6)
EOSINOPHIL NFR BLD AUTO: 4 % (ref 0–5)
ERYTHROCYTE [DISTWIDTH] IN BLOOD BY AUTOMATED COUNT: 16.2 % (ref 11.5–17)
GLOBULIN SER-MCNC: 3.4 GM/DL (ref 2.4–3.5)
GLUCOSE SERPL-MCNC: 155 MG/DL (ref 82–115)
HCT VFR BLD AUTO: 33.5 % (ref 42–52)
HGB BLD-MCNC: 11.3 GM/DL (ref 14–18)
IMM GRANULOCYTES # BLD AUTO: 0.05 10*3/UL (ref 0–0.02)
IMM GRANULOCYTES NFR BLD AUTO: 1 % (ref 0–0.43)
LYMPHOCYTES # BLD AUTO: 0.5 X10(3)/MCL (ref 0.5–4.1)
LYMPHOCYTES NFR BLD AUTO: 10 % (ref 15–40)
MAGNESIUM SERPL-MCNC: 1.9 MG/DL (ref 1.6–2.6)
MCH RBC QN AUTO: 34 PG (ref 27–34)
MCHC RBC AUTO-ENTMCNC: 34 GM/DL (ref 31–36)
MCV RBC AUTO: 100 FL (ref 80–99)
MONOCYTES # BLD AUTO: 0.7 X10(3)/MCL (ref 0–1.1)
MONOCYTES NFR BLD AUTO: 14 % (ref 4–12)
NEUTROPHILS # BLD AUTO: 3.6 X10(3)/MCL (ref 1.5–6.9)
NEUTROPHILS NFR BLD AUTO: 71 % (ref 43–75)
PLATELET # BLD AUTO: 172 X10(3)/MCL (ref 140–400)
PMV BLD AUTO: 11.6 FL (ref 6.8–10)
POTASSIUM SERPL-SCNC: 4.2 MMOL/L (ref 3.5–5.1)
PROT SERPL-MCNC: 7.3 GM/DL (ref 5.8–7.6)
RBC # BLD AUTO: 3.34 X10(6)/MCL (ref 4.7–6.1)
SODIUM SERPL-SCNC: 142 MMOL/L (ref 136–145)
TSH SERPL-ACNC: 4.7 UIU/ML (ref 0.35–4.94)
WBC # SPEC AUTO: 5 X10(3)/MCL (ref 4.5–11.5)

## 2022-01-12 LAB — EST CREAT CLEARANCE SER (OHS): 46.1 ML/MIN

## 2022-02-17 ENCOUNTER — HISTORICAL (OUTPATIENT)
Dept: LAB | Facility: HOSPITAL | Age: 62
End: 2022-02-17

## 2022-02-17 LAB
ABS NEUT (OLG): 4.3 (ref 1.5–6.9)
ALBUMIN SERPL-MCNC: 3.6 G/DL (ref 3.4–4.8)
ALBUMIN/GLOB SERPL: 1 {RATIO} (ref 1.1–2)
ALP SERPL-CCNC: 78 U/L (ref 40–150)
ALT SERPL-CCNC: 20 U/L (ref 0–55)
AST SERPL-CCNC: 20 U/L (ref 5–34)
BASOPHILS # BLD AUTO: 0 10*3/UL (ref 0–0.1)
BASOPHILS NFR BLD AUTO: 0 % (ref 0–1)
BILIRUB SERPL-MCNC: 0.6 MG/DL
BILIRUBIN DIRECT+TOT PNL SERPL-MCNC: 0.2 (ref 0–0.5)
BILIRUBIN DIRECT+TOT PNL SERPL-MCNC: 0.4 (ref 0–0.8)
BUN SERPL-MCNC: 32 MG/DL (ref 8.4–25.7)
CALCIUM SERPL-MCNC: 9.6 MG/DL (ref 8.7–10.5)
CHLORIDE SERPL-SCNC: 103 MMOL/L (ref 98–107)
CO2 SERPL-SCNC: 28 MMOL/L (ref 23–31)
CREAT SERPL-MCNC: 1.34 MG/DL (ref 0.73–1.18)
EOSINOPHIL # BLD AUTO: 0.4 10*3/UL (ref 0–0.6)
EOSINOPHIL NFR BLD AUTO: 6 % (ref 0–5)
ERYTHROCYTE [DISTWIDTH] IN BLOOD BY AUTOMATED COUNT: 13.9 % (ref 11.5–17)
GLOBULIN SER-MCNC: 3.5 G/DL (ref 2.4–3.5)
GLUCOSE SERPL-MCNC: 116 MG/DL (ref 82–115)
HCT VFR BLD AUTO: 35.9 % (ref 42–52)
HEMOLYSIS INTERF INDEX SERPL-ACNC: 2
HEMOLYSIS INTERF INDEX SERPL-ACNC: 2
HGB BLD-MCNC: 11.6 G/DL (ref 14–18)
ICTERIC INTERF INDEX SERPL-ACNC: 0
IMM GRANULOCYTES # BLD AUTO: 0.02 10*3/UL (ref 0–0.02)
IMM GRANULOCYTES NFR BLD AUTO: 0.3 % (ref 0–0.43)
LIPEMIC INTERF INDEX SERPL-ACNC: >10
LYMPHOCYTES # BLD AUTO: 0.6 10*3/UL (ref 0.5–4.1)
LYMPHOCYTES NFR BLD AUTO: 10 % (ref 15–40)
MAGNESIUM SERPL-MCNC: 1.9 MG/DL (ref 1.6–2.6)
MANUAL DIFF? (OHS): NO
MCH RBC QN AUTO: 32 PG (ref 27–34)
MCHC RBC AUTO-ENTMCNC: 32 G/DL (ref 31–36)
MCV RBC AUTO: 98 FL (ref 80–99)
MONOCYTES # BLD AUTO: 0.7 10*3/UL (ref 0–1.1)
MONOCYTES NFR BLD AUTO: 11 % (ref 4–12)
NEUTROPHILS # BLD AUTO: 4.3 10*3/UL (ref 1.5–6.9)
NEUTROPHILS NFR BLD AUTO: 72 % (ref 43–75)
PLATELET # BLD AUTO: 158 10*3/UL (ref 140–400)
PMV BLD AUTO: 10.2 FL (ref 6.8–10)
POTASSIUM SERPL-SCNC: 4.2 MMOL/L (ref 3.5–5.1)
PROT SERPL-MCNC: 7.1 G/DL (ref 5.8–7.6)
RBC # BLD AUTO: 3.67 10*6/UL (ref 4.7–6.1)
SODIUM SERPL-SCNC: 140 MMOL/L (ref 136–145)
TSH SERPL-ACNC: 6.09 M[IU]/L (ref 0.35–4.94)
WBC # SPEC AUTO: 6 10*3/UL (ref 4.5–11.5)

## 2022-03-02 ENCOUNTER — HISTORICAL (OUTPATIENT)
Dept: ADMINISTRATIVE | Facility: HOSPITAL | Age: 62
End: 2022-03-02

## 2022-04-04 ENCOUNTER — HISTORICAL (OUTPATIENT)
Dept: LAB | Facility: HOSPITAL | Age: 62
End: 2022-04-04

## 2022-04-04 LAB — TSH SERPL-ACNC: 1.99 M[IU]/L (ref 0.35–4.94)

## 2022-06-01 ENCOUNTER — CLINICAL SUPPORT (OUTPATIENT)
Dept: RESPIRATORY THERAPY | Facility: HOSPITAL | Age: 62
End: 2022-06-01
Attending: ANESTHESIOLOGY
Payer: MEDICAID

## 2022-06-01 ENCOUNTER — HOSPITAL ENCOUNTER (OUTPATIENT)
Dept: RADIOLOGY | Facility: HOSPITAL | Age: 62
Discharge: HOME | End: 2022-06-01
Attending: SURGERY
Payer: MEDICAID

## 2022-06-01 DIAGNOSIS — T82.598A: ICD-10-CM

## 2022-06-01 DIAGNOSIS — Z01.818 PRE-OP EXAM: ICD-10-CM

## 2022-06-01 DIAGNOSIS — T82.598A: Primary | ICD-10-CM

## 2022-06-01 PROCEDURE — 93005 ELECTROCARDIOGRAM TRACING: CPT

## 2022-06-01 PROCEDURE — 71046 X-RAY EXAM CHEST 2 VIEWS: CPT | Mod: TC

## 2022-06-01 RX ORDER — LEVOTHYROXINE SODIUM 50 UG/1
50 TABLET ORAL EVERY MORNING
COMMUNITY
Start: 2022-05-19 | End: 2022-12-27

## 2022-06-01 RX ORDER — VENLAFAXINE HYDROCHLORIDE 75 MG/1
75 CAPSULE, EXTENDED RELEASE ORAL NIGHTLY
COMMUNITY
Start: 2022-05-27 | End: 2022-09-20

## 2022-06-01 RX ORDER — VENLAFAXINE HYDROCHLORIDE 37.5 MG/1
75 CAPSULE, EXTENDED RELEASE ORAL NIGHTLY
Status: ON HOLD | COMMUNITY
Start: 2022-03-30 | End: 2022-06-13 | Stop reason: HOSPADM

## 2022-06-01 RX ORDER — PROMETHAZINE HYDROCHLORIDE 25 MG/1
25 TABLET ORAL EVERY 6 HOURS PRN
COMMUNITY
Start: 2021-12-17 | End: 2022-09-20

## 2022-06-01 NOTE — DISCHARGE INSTRUCTIONS
Use CHG wipes as directed. Nothing to eat or drink after midnight.     Discharge instructions:    Leave dressing on for 2 days, then remove and  keep clean with soap and water daily in shower, do not lift heavier than 10 lbs until seen by Dr Kirby, no driving today

## 2022-06-13 ENCOUNTER — ANESTHESIA EVENT (OUTPATIENT)
Dept: SURGERY | Facility: HOSPITAL | Age: 62
End: 2022-06-13
Payer: MEDICAID

## 2022-06-13 ENCOUNTER — ANESTHESIA (OUTPATIENT)
Dept: SURGERY | Facility: HOSPITAL | Age: 62
End: 2022-06-13
Payer: MEDICAID

## 2022-06-13 ENCOUNTER — HOSPITAL ENCOUNTER (OUTPATIENT)
Facility: HOSPITAL | Age: 62
Discharge: HOME | End: 2022-06-13
Attending: SURGERY | Admitting: SURGERY
Payer: MEDICAID

## 2022-06-13 DIAGNOSIS — Z45.2 ENCOUNTER FOR REMOVAL OF TUNNELED CENTRAL VENOUS CATHETER (CVC) WITH PORT: ICD-10-CM

## 2022-06-13 LAB — SARS-COV-2 RDRP RESP QL NAA+PROBE: NEGATIVE

## 2022-06-13 PROCEDURE — 63600175 PHARM REV CODE 636 W HCPCS: Performed by: ANESTHESIOLOGY

## 2022-06-13 PROCEDURE — 25000003 PHARM REV CODE 250: Performed by: SURGERY

## 2022-06-13 PROCEDURE — 00400 ANES INTEGUMENTARY SYS NOS: CPT | Performed by: SURGERY

## 2022-06-13 PROCEDURE — 63600175 PHARM REV CODE 636 W HCPCS: Performed by: NURSE ANESTHETIST, CERTIFIED REGISTERED

## 2022-06-13 PROCEDURE — 37000009 HC ANESTHESIA EA ADD 15 MINS: Performed by: SURGERY

## 2022-06-13 PROCEDURE — 36000706: Performed by: SURGERY

## 2022-06-13 PROCEDURE — 36000707: Performed by: SURGERY

## 2022-06-13 PROCEDURE — 25000003 PHARM REV CODE 250

## 2022-06-13 PROCEDURE — 71000015 HC POSTOP RECOV 1ST HR: Performed by: SURGERY

## 2022-06-13 PROCEDURE — 63600175 PHARM REV CODE 636 W HCPCS: Performed by: SURGERY

## 2022-06-13 PROCEDURE — 71000016 HC POSTOP RECOV ADDL HR: Performed by: SURGERY

## 2022-06-13 PROCEDURE — 37000008 HC ANESTHESIA 1ST 15 MINUTES: Performed by: SURGERY

## 2022-06-13 PROCEDURE — 87635 SARS-COV-2 COVID-19 AMP PRB: CPT | Performed by: SURGERY

## 2022-06-13 RX ORDER — CEFAZOLIN SODIUM 2 G/50ML
2 SOLUTION INTRAVENOUS
Status: COMPLETED | OUTPATIENT
Start: 2022-06-13 | End: 2022-06-13

## 2022-06-13 RX ORDER — MIDAZOLAM HYDROCHLORIDE 1 MG/ML
INJECTION INTRAMUSCULAR; INTRAVENOUS
Status: DISCONTINUED | OUTPATIENT
Start: 2022-06-13 | End: 2022-06-13

## 2022-06-13 RX ORDER — BUPIVACAINE HYDROCHLORIDE 5 MG/ML
INJECTION, SOLUTION EPIDURAL; INTRACAUDAL
Status: DISCONTINUED | OUTPATIENT
Start: 2022-06-13 | End: 2022-06-13 | Stop reason: HOSPADM

## 2022-06-13 RX ORDER — ONDANSETRON 2 MG/ML
INJECTION INTRAMUSCULAR; INTRAVENOUS
Status: DISCONTINUED | OUTPATIENT
Start: 2022-06-13 | End: 2022-06-13

## 2022-06-13 RX ORDER — LIDOCAINE HYDROCHLORIDE 20 MG/ML
INJECTION, SOLUTION EPIDURAL; INFILTRATION; INTRACAUDAL; PERINEURAL
Status: DISCONTINUED | OUTPATIENT
Start: 2022-06-13 | End: 2022-06-13 | Stop reason: HOSPADM

## 2022-06-13 RX ORDER — SODIUM CHLORIDE, SODIUM LACTATE, POTASSIUM CHLORIDE, CALCIUM CHLORIDE 600; 310; 30; 20 MG/100ML; MG/100ML; MG/100ML; MG/100ML
INJECTION, SOLUTION INTRAVENOUS CONTINUOUS
Status: ACTIVE | OUTPATIENT
Start: 2022-06-13

## 2022-06-13 RX ADMIN — SODIUM CHLORIDE, POTASSIUM CHLORIDE, SODIUM LACTATE AND CALCIUM CHLORIDE: 600; 310; 30; 20 INJECTION, SOLUTION INTRAVENOUS at 07:06

## 2022-06-13 RX ADMIN — CEFAZOLIN SODIUM 2 G: 2 SOLUTION INTRAVENOUS at 09:06

## 2022-06-13 RX ADMIN — MIDAZOLAM HYDROCHLORIDE 2 MG: 1 INJECTION, SOLUTION INTRAMUSCULAR; INTRAVENOUS at 09:06

## 2022-06-13 RX ADMIN — MIDAZOLAM HYDROCHLORIDE 1 MG: 1 INJECTION, SOLUTION INTRAMUSCULAR; INTRAVENOUS at 09:06

## 2022-06-13 RX ADMIN — ONDANSETRON 4 MG: 2 INJECTION INTRAMUSCULAR; INTRAVENOUS at 09:06

## 2022-06-13 NOTE — ANESTHESIA PREPROCEDURE EVALUATION
06/13/2022  Esteban Martinez is a 61 y.o., male.      Pre-op Assessment    I have reviewed the Patient Summary Reports.     I have reviewed the Nursing Notes. I have reviewed the NPO Status.   I have reviewed the Medications.     Review of Systems  Anesthesia Hx:  Denies Family Hx of Anesthesia complications.   Denies Personal Hx of Anesthesia complications.   Social:  Non-Smoker, No Alcohol Use    Hematology/Oncology:  Hematology Normal       Throat Current/Recent Cancer. Other (see Oncology comments)   EENT/Dental:EENT/Dental Normal   Cardiovascular:   Hypertension    Pulmonary:  Pulmonary Normal    Renal/:  Renal/ Normal     Hepatic/GI:  Hepatic/GI Normal    Musculoskeletal:  Musculoskeletal Normal    Neurological:  Neurology Normal    Endocrine:  Endocrine Normal    Dermatological:  Skin Normal    Psych:  Psychiatric Normal           Physical Exam  General: Cooperative, Alert and Oriented    Airway:  Mallampati: II   Mouth Opening: Normal  TM Distance: Normal  Tongue: Normal  Neck ROM: Normal ROM    Dental:  Intact        Anesthesia Plan  Type of Anesthesia, risks & benefits discussed:    Anesthesia Type: Gen Natural Airway  Intra-op Monitoring Plan: Standard ASA Monitors  Post Op Pain Control Plan:   (medical reason for not using multimodal pain management)  Induction:  IV  Informed Consent: Informed consent signed with the Patient and all parties understand the risks and agree with anesthesia plan.  All questions answered. Patient consented to blood products? Yes  ASA Score: 3    Ready For Surgery From Anesthesia Perspective.     .

## 2022-06-13 NOTE — ANESTHESIA POSTPROCEDURE EVALUATION
Anesthesia Post Evaluation    Patient: Esteban Michelle    Procedure(s) Performed: Procedure(s) (LRB):  REMOVAL, CATHETER, CENTRAL VENOUS, TUNNELED, WITH PORT removal mediprt (Right)    Final Anesthesia Type: MAC      Patient location during evaluation: OPS  Patient participation: Yes- Able to Participate  Level of consciousness: awake and alert and oriented  Post-procedure vital signs: reviewed and stable  Pain management: adequate  Airway patency: patent    PONV status at discharge: No PONV  Anesthetic complications: no      Cardiovascular status: blood pressure returned to baseline and stable  Respiratory status: unassisted, room air and spontaneous ventilation  Hydration status: euvolemic  Follow-up not needed.          Vitals Value Taken Time   /82 06/13/22 0715   Temp 36.9 °C (98.5 °F) 06/13/22 0715   Pulse 61 06/13/22 0715   Resp 18 06/13/22 0715   SpO2 99 % 06/13/22 0715         No case tracking events are documented in the log.      Pain/Martin Score: No data recorded

## 2022-06-13 NOTE — OP NOTE
[facility]                     [address] [address]    PATIENT NAME:      KOMAL BERNABE   YOB: 1960  CSN:               [id number]  MRN:               [id number]  ADMIT DATE:        06/13/2022 06:57:00  PHYSICIAN:         Mirta Glover MD                          OPERATIVE REPORT      DATE OF SURGERY:    06/13/2022 00:00:00    SURGEON:  Mirta Glover MD    PREOPERATIVE DIAGNOSES:    1. Throat cancer status post treatment.  2. MediPort.    POSTOPERATIVE DIAGNOSES:    1. Throat cancer status post treatment.  2. MediPort.    PROCEDURE:  Removal MediPort.    ASSIST:  OR staff.    COMPLICATIONS:  None.    FINDINGS:  Port removed without complication.    SPECIMEN:  Port for gross examination.    OPERATIVE REPORT:  Patient was brought to the OR, placed in supine position.    Anesthesia was provided by IV sedation.  The chest and neck were prepped and   draped in sterile manner.  Local anesthetic was provided subcutaneously.  After   time-out was performed, incision was created over previous scar using a 15 blade   scalpel.  Subcutaneous tissue was dissected down to the port.  Stitches were   removed, securing the port to the pectoral fascia x3.  3-0 Vicryl suture was   placed around the catheter tract and the catheter was removed with the patient   in Trendelenburg position.  The suture was tied down.  An additional suture was   placed around the tract.  Otherwise, hemostasis was ensured.  Subcutaneous   tissue including dermis was reapproximated with 3-0 Vicryl suture in a simple   interrupted manner, inverted.  The skin was closed with 4-0 plain gut in a   subcuticular manner.  Sterile dressings were applied.  The patient tolerated the   procedure well without complication.        ______________________________  Mirta Glover MD    SS/AQS  DD:  06/13/2022  Time:  09:40AM  DT:  06/13/2022  Time:   10:39AM  Job #:  [id number]    cc:   Dr. Lima        [facility]        OPERATIVE REPORT

## 2022-06-14 VITALS
HEIGHT: 69 IN | SYSTOLIC BLOOD PRESSURE: 119 MMHG | HEART RATE: 52 BPM | RESPIRATION RATE: 20 BRPM | BODY MASS INDEX: 23.4 KG/M2 | TEMPERATURE: 99 F | DIASTOLIC BLOOD PRESSURE: 71 MMHG | OXYGEN SATURATION: 99 % | WEIGHT: 158 LBS

## 2022-06-19 NOTE — PROGRESS NOTES
PATIENT: Esteban Martinez  MRN: [id number]  DATE: 6/19/2022        Chief Complaint: No chief complaint on file.      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 Martinez is a 61 y.o. male who returns for followup:  6/20/22 has a CT next month then will see Dr. Borden and Dr. Gavin due to uptake at tongue on PET. He will see both of them afterward. He gained his taste but lost it again about 2 weeks ago. He admits it was never very good. Has lost an additional 1 pound.   4/5/22 Mr. Martinez is a 61 year old male who is seen today via telemedicine. He was hospitalized for dehydration post weekly cisplatin treatment for oropharynx cancer.   Started concurrent chemotherapy/radiation on 9/28/21.  Radiation  completed 11/16/21.  Completed weekly Cisplatin  11/10/21.  He was hospitalized 11/14/21 for dehydration. He was seen in ER, 1/10 & 1/11. Reports burning to the back of his tongue and throat, that is improving somewhat. Some difficulty swallowing at times . Attributes to radiation.  Uses heliosis rinse approximately 1-2 times per day if able.  Does not use salt/baking soda/warm water rinses (states it burns).  Denies abdominal pain or constipation, excess gas/distention, n/v/d/c, abnormal bruising/bleeding.  He had a PET scan and visit with Dr Borden on 2/17/22.  Tongue mass with no uptake but some uptake in neck. Plans to repeat in 3 months. Continues with fatigue. Had fiberoptic scope by Dr. Navjot Gavin in [place], January 2022.  He was seen in the ER at [facility] on 3/2/22 with reports of abdominal pain, diagnosed with gastritis. CT Abdomen was normal.   States that he is eating well and planning to remove peg tube next month.      Past Medical History:   Past Medical  History:   Diagnosis Date    Anxiety     Hyperlipidemia     Hypertension     Throat cancer    Cancer of base of tongue   Chronic tension-type headache   Dehydration   HTN - Hypertension   Hypercholesterolemia   Hyperthyroidism     Past Surgical HIstory:   Past Surgical History:   Procedure Laterality Date    CATARACT EXTRACTION      CHOLECYSTECTOMY      EXPLORATION OF EAR      GASTROSTOMY TUBE PLACEMENT      MEDIPORT REMOVAL Right 6/13/2022    Procedure: REMOVAL, CATHETER, CENTRAL VENOUS, TUNNELED, WITH PORT removal mediprt;  Surgeon: ZAINAB Edwards MD;  Location: [facility];  Service: General;  Laterality: Right;    PEG TUBE REMOVAL      SHOULDER SURGERY Left        Family History:   Family History   Problem Relation Age of Onset    Colon cancer Mother     Brain cancer Father        Social History:  reports that he has never smoked. He has never used smokeless tobacco. He reports that he does not drink alcohol and does not use drugs.    Allergies:  Review of patient's allergies indicates:   Allergen Reactions    Codeine     Tramadol     Darvocet a500 [propoxyphene n-acetaminophen]     Tylenol [acetaminophen]      codeine Unknown  Darvocet A500 Nausea and vomiting  traMADol Nausea and vomiting  Tylenol Extra Strength Unknown  Medications:  Current Outpatient Medications   Medication Sig Dispense Refill    busPIRone (BUSPAR) 5 MG Tab Take 5 mg by mouth 2 (two) times daily.      levothyroxine (SYNTHROID) 50 MCG tablet Take 50 mcg by mouth every morning.      promethazine (PHENERGAN) 25 MG tablet Take 25 mg by mouth every 6 (six) hours as needed.      sildenafiL (VIAGRA) 100 MG tablet Take 1 tablet (100 mg total) by mouth daily as needed for Erectile Dysfunction. 30 tablet 11    venlafaxine (EFFEXOR-XR) 75 MG 24 hr capsule 75 mg every evening.       No current facility-administered medications for this visit.     Facility-Administered Medications Ordered in Other Visits   Medication Dose  Route Frequency Provider Last Rate Last Admin    lactated ringers infusion   Intravenous Continuous Daniel Antoine,  10 mL/hr at 06/13/22 0738 New Bag at 06/13/22 0738       Review of Systems    ECOG Performance Status:   ECOG SCORE       Constitutional:  Fever, Weakness.    Eye:  Negative except as documented in history of present illness.    Ear/Nose/Mouth/Throat:  Negative except as documented in history of present illness.    Respiratory:  Negative except as documented in history of present illness.    Cardiovascular:  Negative except as documented in history of present illness.    Gastrointestinal:  Negative except as documented in history of present illness.    Genitourinary:  Negative except as documented in history of present illness.    Hematology/Lymphatics:  Negative except as documented in history of present illness.    Endocrine:  Negative except as documented in history of present illness.    Immunologic:  Negative except as documented in history of present illness.    Musculoskeletal:  Negative except as documented in history of present illness.    Integumentary:  Negative except as documented in history of present illness.    Neurologic:  Negative except as documented in history of present illness.    Psychiatric:  Negative except as documented in history of present illness.    Objective:      Vitals: There were no vitals filed for this visit.  BMI: There is no height or weight on file to calculate BMI.    Physical Exam  Constitutional:       Appearance: Normal appearance.   HENT:      Head: Normocephalic and atraumatic.      Nose: Nose normal.      Mouth/Throat:      Mouth: Mucous membranes are dry.   Cardiovascular:      Rate and Rhythm: Normal rate and regular rhythm.      Pulses: Normal pulses.      Heart sounds: Normal heart sounds.   Pulmonary:      Effort: Pulmonary effort is normal.      Breath sounds: Normal breath sounds.   Abdominal:      General: Abdomen is flat.      Palpations:  Abdomen is soft.   Musculoskeletal:         General: Normal range of motion.      Cervical back: Normal range of motion and neck supple.   Skin:     General: Skin is warm and dry.   Neurological:      General: No focal deficit present.      Mental Status: He is oriented to person, place, and time.   Psychiatric:         Mood and Affect: Mood normal.         Laboratory Data:  No visits with results within 1 Week(s) from this visit.   Latest known visit with results is:   Lab Visit on 06/13/2022   Component Date Value Ref Range Status    Sodium Level 06/13/2022 139  136 - 145 mmol/L Final    Potassium Level 06/13/2022 3.7  3.5 - 5.1 mmol/L Final    Chloride 06/13/2022 105  98 - 107 mmol/L Final    Carbon Dioxide 06/13/2022 26  23 - 31 mmol/L Final    Glucose Level 06/13/2022 84  82 - 115 mg/dL Final    Blood Urea Nitrogen 06/13/2022 18.0  8.4 - 25.7 mg/dL Final    Creatinine 06/13/2022 1.31 (A) 0.73 - 1.18 mg/dL Final    Calcium Level Total 06/13/2022 9.3  8.8 - 10.0 mg/dL Final    Protein Total 06/13/2022 7.1  5.8 - 7.6 gm/dL Final    Albumin Level 06/13/2022 3.9  3.4 - 4.8 gm/dL Final    Globulin 06/13/2022 3.2  2.4 - 3.5 gm/dL Final    Albumin/Globulin Ratio 06/13/2022 1.2  1.1 - 2.0 ratio Final    Bilirubin Total 06/13/2022 0.7  <=1.5 mg/dL Final    Alkaline Phosphatase 06/13/2022 91  40 - 150 unit/L Final    Alanine Aminotransferase 06/13/2022 18  0 - 55 unit/L Final    Aspartate Aminotransferase 06/13/2022 24  5 - 34 unit/L Final    Estimated GFR-Non  06/13/2022 59  mls/min/1.73/m2 Final    Magnesium Level 06/13/2022 1.90  1.60 - 2.60 mg/dL Final    WBC 06/13/2022 3.7 (A) 4.5 - 11.5 x10(3)/mcL Final    RBC 06/13/2022 4.10 (A) 4.70 - 6.10 x10(6)/mcL Final    Hgb 06/13/2022 12.4 (A) 14.0 - 18.0 gm/dL Final    Hct 06/13/2022 39.3 (A) 42.0 - 52.0 % Final    MCV 06/13/2022 95.9 (A) 80.0 - 94.0 fL Final    MCH 06/13/2022 30.2  27.0 - 31.0 pg Final    MCHC 06/13/2022 31.6 (A)  "33.0 - 36.0 mg/dL Final    RDW 06/13/2022 14.5  11.5 - 17.0 % Final    Platelet 06/13/2022 160  130 - 400 x10(3)/mcL Final    MPV 06/13/2022 10.2  9.4 - 12.4 fL Final    IG# 06/13/2022 0.01  0 - 0.0155 x10(3)/mcL Final    IG% 06/13/2022 0.3  0 - 0.43 % Final    Neut Man 06/13/2022 66  47 - 80 % Final    Lymph Man 06/13/2022 22  13 - 40 % Final    Monocyte Man 06/13/2022 11  2 - 11 % Final    Basophil Man 06/13/2022 1  0 - 2 % Final    Abs Neut calc 06/13/2022 2.442  2.1 - 9.2 x10(3)/mcL Final    Abs Baso 06/13/2022 0.037  0 - 0.2 x10(3)/mcL Final    Abs Mono 06/13/2022 0.407  0.1 - 1.3 x10(3)/mcL Final    Abs Lymp 06/13/2022 0.814 (A) 0.6 - 4.6 x10(3)/mcL Final    Macrocyte 06/13/2022 Slight (A) (none) Final    Hypochrom 06/13/2022 Slight (A) (none) Final    Ovalocytes 06/13/2022 Slight (A) (none) Final    Platelet Est 06/13/2022 Adequate  Normal, Adequate Final         Imaging:   3/2/22:  CT AP: No acute abnormality   - - CT neck 8/23/21 - bulky soft tissue in no e region of the tongue base (3.5 x 3.1 cm) and palatine tonsillar fossa with partial occlusion of the left epiglottic valleculae and regional airway.  -  - PET/CT 9/8/21 ([facility])-hypermetabolic tongue base mass offset to the left with associated left level 2 cervical lymph node metastasis.  Low-grade mediastinal lymph node activity suspect reactive  PET 2/11/22 [facility]  Assessment:     1. Cancer of base of tongue    2. Metastasis to cervical lymph node      Impression and Plan   1. 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  - Dr. Navjot Gavin in [place] 8/25/21, exam documents "large exophytic mass filling the entire left side which appears to be emanating from the L glossotonsillar sulcus.  There is mass effect " "on the vallecula but no involvement of the vallecula.  There is also mass effect on the epiglottis but no involvement."   - 8/25/21 - L neck FNA - squamous cell carcinoma, p16 positive  - PET/CT 9/8/21 ([facility])-hypermetabolic tongue base mass offset to the left with associated left level 2 cervical lymph node metastasis.  Low-grade mediastinal lymph node activity suspect reactive    Weekly cisplatin 40mg/m2. Delay in cycle # 4 on 10/12/2021(hospitalization - peg tube placement).    Cisplatin completed 11/10/21.  Patient completed radiation therapy on 11/16/2021.    PET/CT on 2/11/22 revealed no evidence of recurrence or metastatic diseases.    Patient saw Dr Borden on 2/17/22. He had fiberoptic scope by Dr. Navjot Gavin in [place], January 2022.    --Repeat PET/CT in May revealed intense what appears to be physiologic activity of his tongue musculature. Resolving neck lymphadenopathy.    CT is ordered in 6/2022 and will follow up with Dr. Borden and Romaine for reassessment     2. Constipation: has improved with medication, continue stool softener prn     3. PEG Tube removed 05/2022.        Plan:     Problem List Items Addressed This Visit    None     Visit Diagnoses     Cancer of base of tongue    -  Primary    Metastasis to cervical lymph node            .RTC 3 months. Follow up with Dr. Borden and Dr Gavin for CT neck to be done due to 5/2022 PET showing uptake in the tongue thought to be physiologic.       "

## 2022-06-20 ENCOUNTER — LAB VISIT (OUTPATIENT)
Dept: LAB | Facility: HOSPITAL | Age: 62
End: 2022-06-20
Payer: MEDICAID

## 2022-06-20 ENCOUNTER — OFFICE VISIT (OUTPATIENT)
Dept: HEMATOLOGY/ONCOLOGY | Facility: CLINIC | Age: 62
End: 2022-06-20
Payer: MEDICAID

## 2022-06-20 VITALS
HEIGHT: 70 IN | RESPIRATION RATE: 18 BRPM | DIASTOLIC BLOOD PRESSURE: 82 MMHG | OXYGEN SATURATION: 99 % | BODY MASS INDEX: 23.14 KG/M2 | HEART RATE: 63 BPM | TEMPERATURE: 99 F | WEIGHT: 161.63 LBS | SYSTOLIC BLOOD PRESSURE: 133 MMHG

## 2022-06-20 DIAGNOSIS — C77.0 METASTASIS TO CERVICAL LYMPH NODE: ICD-10-CM

## 2022-06-20 DIAGNOSIS — C01 CANCER OF BASE OF TONGUE: Primary | ICD-10-CM

## 2022-06-20 DIAGNOSIS — C02.9 CANCER OF TONGUE: Primary | ICD-10-CM

## 2022-06-20 LAB
FERRITIN SERPL-MCNC: 746.15 NG/ML (ref 21.81–274.66)
FOLATE SERPL-MCNC: 12.5 NG/ML (ref 7–31.4)
IRON SATN MFR SERPL: 35 % (ref 20–50)
IRON SERPL-MCNC: 90 UG/DL (ref 65–175)
RET# (OHS): 0.05 (ref 0.03–0.1)
RETICULOCYTE COUNT AUTOMATED (OLG): 1.24 % (ref 1.1–2.1)
TIBC SERPL-MCNC: 165 UG/DL (ref 69–240)
TIBC SERPL-MCNC: 255 UG/DL (ref 250–450)
VIT B12 SERPL-MCNC: 468 PG/ML (ref 213–816)

## 2022-06-20 PROCEDURE — 1159F PR MEDICATION LIST DOCUMENTED IN MEDICAL RECORD: ICD-10-PCS | Mod: CPTII,,, | Performed by: INTERNAL MEDICINE

## 2022-06-20 PROCEDURE — 99214 PR OFFICE/OUTPT VISIT, EST, LEVL IV, 30-39 MIN: ICD-10-PCS | Mod: S$PBB,,, | Performed by: INTERNAL MEDICINE

## 2022-06-20 PROCEDURE — 3008F PR BODY MASS INDEX (BMI) DOCUMENTED: ICD-10-PCS | Mod: CPTII,,, | Performed by: INTERNAL MEDICINE

## 2022-06-20 PROCEDURE — 99214 OFFICE O/P EST MOD 30 MIN: CPT | Mod: S$PBB,,, | Performed by: INTERNAL MEDICINE

## 2022-06-20 PROCEDURE — 82728 ASSAY OF FERRITIN: CPT

## 2022-06-20 PROCEDURE — 1159F MED LIST DOCD IN RCRD: CPT | Mod: CPTII,,, | Performed by: INTERNAL MEDICINE

## 2022-06-20 PROCEDURE — 3008F BODY MASS INDEX DOCD: CPT | Mod: CPTII,,, | Performed by: INTERNAL MEDICINE

## 2022-06-20 PROCEDURE — 3079F DIAST BP 80-89 MM HG: CPT | Mod: CPTII,,, | Performed by: INTERNAL MEDICINE

## 2022-06-20 PROCEDURE — 3075F PR MOST RECENT SYSTOLIC BLOOD PRESS GE 130-139MM HG: ICD-10-PCS | Mod: CPTII,,, | Performed by: INTERNAL MEDICINE

## 2022-06-20 PROCEDURE — 82607 VITAMIN B-12: CPT

## 2022-06-20 PROCEDURE — 3079F PR MOST RECENT DIASTOLIC BLOOD PRESSURE 80-89 MM HG: ICD-10-PCS | Mod: CPTII,,, | Performed by: INTERNAL MEDICINE

## 2022-06-20 PROCEDURE — 99999 PR PBB SHADOW E&M-EST. PATIENT-LVL IV: CPT | Mod: PBBFAC,,, | Performed by: INTERNAL MEDICINE

## 2022-06-20 PROCEDURE — 85045 AUTOMATED RETICULOCYTE COUNT: CPT

## 2022-06-20 PROCEDURE — 99214 OFFICE O/P EST MOD 30 MIN: CPT | Mod: PBBFAC | Performed by: INTERNAL MEDICINE

## 2022-06-20 PROCEDURE — 82746 ASSAY OF FOLIC ACID SERUM: CPT

## 2022-06-20 PROCEDURE — 99999 PR PBB SHADOW E&M-EST. PATIENT-LVL IV: ICD-10-PCS | Mod: PBBFAC,,, | Performed by: INTERNAL MEDICINE

## 2022-06-20 PROCEDURE — 36415 COLL VENOUS BLD VENIPUNCTURE: CPT

## 2022-06-20 PROCEDURE — 3075F SYST BP GE 130 - 139MM HG: CPT | Mod: CPTII,,, | Performed by: INTERNAL MEDICINE

## 2022-06-20 PROCEDURE — 83540 ASSAY OF IRON: CPT

## 2022-06-20 RX ORDER — PANTOPRAZOLE SODIUM 40 MG/1
40 TABLET, DELAYED RELEASE ORAL DAILY
COMMUNITY
Start: 2022-06-14 | End: 2022-06-20

## 2022-06-22 ENCOUNTER — OFFICE VISIT (OUTPATIENT)
Dept: HEMATOLOGY/ONCOLOGY | Facility: CLINIC | Age: 62
End: 2022-06-22
Payer: MEDICAID

## 2022-06-22 VITALS
HEART RATE: 61 BPM | BODY MASS INDEX: 24.13 KG/M2 | OXYGEN SATURATION: 100 % | HEIGHT: 69 IN | WEIGHT: 162.94 LBS | SYSTOLIC BLOOD PRESSURE: 129 MMHG | RESPIRATION RATE: 16 BRPM | TEMPERATURE: 98 F | DIASTOLIC BLOOD PRESSURE: 81 MMHG

## 2022-06-22 DIAGNOSIS — C01 CANCER OF BASE OF TONGUE: Primary | ICD-10-CM

## 2022-06-22 PROCEDURE — 3008F PR BODY MASS INDEX (BMI) DOCUMENTED: ICD-10-PCS | Mod: CPTII,,,

## 2022-06-22 PROCEDURE — 99214 OFFICE O/P EST MOD 30 MIN: CPT | Mod: PBBFAC

## 2022-06-22 PROCEDURE — 3008F BODY MASS INDEX DOCD: CPT | Mod: CPTII,,,

## 2022-06-22 PROCEDURE — 3074F PR MOST RECENT SYSTOLIC BLOOD PRESSURE < 130 MM HG: ICD-10-PCS | Mod: CPTII,,,

## 2022-06-22 PROCEDURE — 99999 PR PBB SHADOW E&M-EST. PATIENT-LVL IV: CPT | Mod: PBBFAC,,,

## 2022-06-22 PROCEDURE — 1159F PR MEDICATION LIST DOCUMENTED IN MEDICAL RECORD: ICD-10-PCS | Mod: CPTII,,,

## 2022-06-22 PROCEDURE — 99213 OFFICE O/P EST LOW 20 MIN: CPT | Mod: S$PBB,,,

## 2022-06-22 PROCEDURE — 99213 PR OFFICE/OUTPT VISIT, EST, LEVL III, 20-29 MIN: ICD-10-PCS | Mod: S$PBB,,,

## 2022-06-22 PROCEDURE — 3079F PR MOST RECENT DIASTOLIC BLOOD PRESSURE 80-89 MM HG: ICD-10-PCS | Mod: CPTII,,,

## 2022-06-22 PROCEDURE — 99999 PR PBB SHADOW E&M-EST. PATIENT-LVL IV: ICD-10-PCS | Mod: PBBFAC,,,

## 2022-06-22 PROCEDURE — 3074F SYST BP LT 130 MM HG: CPT | Mod: CPTII,,,

## 2022-06-22 PROCEDURE — 1159F MED LIST DOCD IN RCRD: CPT | Mod: CPTII,,,

## 2022-06-22 PROCEDURE — 3079F DIAST BP 80-89 MM HG: CPT | Mod: CPTII,,,

## 2022-06-22 NOTE — PROGRESS NOTES
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 Martinez is a 61 y.o. male who returns for followup.   6/20/22 has a CT next month then will see Dr. Borden and Dr. Gavin due to uptake at tongue on PET. He will see both of them afterward. He gained his taste but lost it again about 2 weeks ago. He admits it was never very good. Has lost an additional 1 pound.   4/5/22 Mr. Martinez is a 61 year old male who is seen today via telemedicine. He was hospitalized for dehydration post weekly cisplatin treatment for oropharynx cancer.   Started concurrent chemotherapy/radiation on 9/28/21.  Radiation  completed 11/16/21.  Completed weekly Cisplatin  11/10/21.  He was hospitalized 11/14/21 for dehydration. He was seen in ER, 1/10 & 1/11. Reports burning to the back of his tongue and throat, that is improving somewhat. Some difficulty swallowing at times . Attributes to radiation.  Uses heliosis rinse approximately 1-2 times per day if able.  Does not use salt/baking soda/warm water rinses (states it burns).  Denies abdominal pain or constipation, excess gas/distention, n/v/d/c, abnormal bruising/bleeding.  He had a PET scan and visit with Dr Borden on 2/17/22.  Tongue mass with no uptake but some uptake in neck. Plans to repeat in 3 months. Continues with fatigue. Had fiberoptic scope by Dr. Navjot Gavin in [place], January 2022.  He was seen in the ER at [facility] on 3/2/22 with reports of abdominal pain, diagnosed with gastritis. CT Abdomen was normal.   States that he is eating well and planning to remove peg tube next month.     6/22/22. Esteban  returns to clinic to review anemia labs. Iron, iron sat, retic, folate, vit b12 all WNL. Ferritin is noted to be elevated, possibly a reactive response to an inflammatory process.      Past Medical History:   Past Medical History:   Diagnosis Date    Anxiety     Cancer of base of tongue     Hyperlipidemia     Hypertension     Hyperthyroidism     Throat cancer    Cancer of base of tongue   Chronic tension-type headache   Dehydration   HTN - Hypertension   Hypercholesterolemia   Hyperthyroidism     Past Surgical HIstory:   Past Surgical History:   Procedure Laterality Date    CATARACT EXTRACTION      CHOLECYSTECTOMY      EXPLORATION OF EAR      GASTROSTOMY TUBE PLACEMENT      MEDIPORT REMOVAL Right 6/13/2022    Procedure: REMOVAL, CATHETER, CENTRAL VENOUS, TUNNELED, WITH PORT removal mediprt;  Surgeon: ZAINAB Edwards MD;  Location: [facility];  Service: General;  Laterality: Right;    PEG TUBE REMOVAL      SHOULDER SURGERY Left        Family History:   Family History   Problem Relation Age of Onset    Colon cancer Mother     Brain cancer Father        Social History:  reports that he has never smoked. He has never used smokeless tobacco. He reports that he does not drink alcohol and does not use drugs.    Allergies:  Review of patient's allergies indicates:   Allergen Reactions    Codeine Other (See Comments)     Seizures    Tramadol Nausea And Vomiting    Acetaminophen Other (See Comments)     Extra strength    Propoxyphene n-acetaminophen Nausea And Vomiting     codeine Unknown  Darvocet A500 Nausea and vomiting  traMADol Nausea and vomiting  Tylenol Extra Strength Unknown  Medications:  Current Outpatient Medications   Medication Sig Dispense Refill    busPIRone (BUSPAR) 5 MG Tab Take 5 mg by mouth 2 (two) times daily.      levothyroxine (SYNTHROID) 50 MCG tablet Take 50 mcg by mouth every morning.      promethazine (PHENERGAN) 25 MG tablet Take 25 mg by mouth every 6 (six) hours as  needed.      sildenafiL (VIAGRA) 100 MG tablet Take 1 tablet (100 mg total) by mouth daily as needed for Erectile Dysfunction. 30 tablet 11    venlafaxine (EFFEXOR-XR) 75 MG 24 hr capsule 75 mg every evening.       No current facility-administered medications for this visit.     Facility-Administered Medications Ordered in Other Visits   Medication Dose Route Frequency Provider Last Rate Last Admin    lactated ringers infusion   Intravenous Continuous Daniel Brisenoannelise, DO 10 mL/hr at 06/13/22 0738 New Bag at 06/13/22 0738       Review of Systems   Constitutional: Negative for appetite change and unexpected weight change.   HENT: Negative for mouth sores.    Eyes: Negative for visual disturbance.   Respiratory: Negative for cough and shortness of breath.    Cardiovascular: Negative for chest pain.   Gastrointestinal: Negative for abdominal pain and diarrhea.   Genitourinary: Negative for frequency.   Musculoskeletal: Negative for back pain.   Skin: Negative for rash.   Neurological: Negative for headaches.   Hematological: Negative for adenopathy.   Psychiatric/Behavioral: The patient is nervous/anxious.        ECOG Performance Status:   ECOG SCORE       Constitutional:  Fever, Weakness.    Eye:  Negative except as documented in history of present illness.    Ear/Nose/Mouth/Throat:  Negative except as documented in history of present illness.    Respiratory:  Negative except as documented in history of present illness.    Cardiovascular:  Negative except as documented in history of present illness.    Gastrointestinal:  Negative except as documented in history of present illness.    Genitourinary:  Negative except as documented in history of present illness.    Hematology/Lymphatics:  Negative except as documented in history of present illness.    Endocrine:  Negative except as documented in history of present illness.    Immunologic:  Negative except as documented in history of present illness.    Musculoskeletal:  " Negative except as documented in history of present illness.    Integumentary:  Negative except as documented in history of present illness.    Neurologic:  Negative except as documented in history of present illness.    Psychiatric:  Negative except as documented in history of present illness.    Objective:      Vitals:   Vitals:    06/22/22 1311   BP: 129/81   BP Location: Left arm   Patient Position: Sitting   Pulse: 61   Resp: 16   Temp: 97.9 °F (36.6 °C)   SpO2: 100%   Weight: 73.9 kg (162 lb 14.7 oz)   Height: 5' 9" (1.753 m)     BMI: Body mass index is 24.06 kg/m².    Physical Exam  Constitutional:       Appearance: Normal appearance.   HENT:      Head: Normocephalic and atraumatic.      Nose: Nose normal.      Mouth/Throat:      Mouth: Mucous membranes are dry.   Cardiovascular:      Rate and Rhythm: Normal rate and regular rhythm.      Pulses: Normal pulses.      Heart sounds: Normal heart sounds.   Pulmonary:      Effort: Pulmonary effort is normal.      Breath sounds: Normal breath sounds.   Abdominal:      General: Abdomen is flat.      Palpations: Abdomen is soft.   Musculoskeletal:         General: Normal range of motion.      Cervical back: Normal range of motion and neck supple.   Skin:     General: Skin is warm and dry.   Neurological:      General: No focal deficit present.      Mental Status: He is oriented to person, place, and time.   Psychiatric:         Mood and Affect: Mood normal.         Laboratory Data:  Lab Visit on 06/20/2022   Component Date Value Ref Range Status    Ferritin Level 06/20/2022 746.15 (A) 21.81 - 274.66 ng/mL Final    Iron Binding Capacity Unsaturated 06/20/2022 165  69 - 240 ug/dL Final    Iron Level 06/20/2022 90  65 - 175 ug/dL Final    Iron Binding Capacity Total 06/20/2022 255  250 - 450 ug/dL Final    Iron Saturation 06/20/2022 35  20 - 50 % Final    Folate Level 06/20/2022 12.5  7.0 - 31.4 ng/mL Final    Retic Cnt Auto 06/20/2022 1.24  1.1 - 2.1 % Final    " "RET# 06/20/2022 0.0510  0.026 - 0.095 Final    Vitamin B12 Level 06/20/2022 468  213 - 816 pg/mL Final         Imaging:   3/2/22:  CT AP: No acute abnormality   - - CT neck 8/23/21 - bulky soft tissue in no e region of the tongue base (3.5 x 3.1 cm) and palatine tonsillar fossa with partial occlusion of the left epiglottic valleculae and regional airway.  -  - PET/CT 9/8/21 ([facility])-hypermetabolic tongue base mass offset to the left with associated left level 2 cervical lymph node metastasis.  Low-grade mediastinal lymph node activity suspect reactive  PET 2/11/22 [facility]  Assessment:     No diagnosis found.  Impression and Plan   1. 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  - Dr. Navjot Gavin in [place] 8/25/21, exam documents "large exophytic mass filling the entire left side which appears to be emanating from the L glossotonsillar sulcus.  There is mass effect on the vallecula but no involvement of the vallecula.  There is also mass effect on the epiglottis but no involvement."   - 8/25/21 - L neck FNA - squamous cell carcinoma, p16 positive  - PET/CT 9/8/21 ([facility])-hypermetabolic tongue base mass offset to the left with associated left level 2 cervical lymph node metastasis.  Low-grade mediastinal lymph node activity suspect reactive    Weekly cisplatin 40mg/m2. Delay in cycle # 4 on 10/12/2021(hospitalization - peg tube placement).    Cisplatin completed 11/10/21.  Patient completed radiation therapy on 11/16/2021.    PET/CT on 2/11/22 revealed no evidence of recurrence or metastatic diseases.    Patient saw Dr Borden on 2/17/22. He had fiberoptic scope by Dr. Navjot Gavin in [place], January 2022.    --Repeat PET/CT in May revealed intense what appears to be physiologic " activity of his tongue musculature. Resolving neck lymphadenopathy.    CT is ordered in 6/2022 and will follow up with Dr. Borden and Romaine for reassessment     2. Constipation: has improved with medication, continue stool softener prn     3. PEG Tube removed 05/2022.        Plan:   .RTC 3 months.   Follow up with Dr. Borden and Dr Gavin for CT neck to be done due to 5/2022 PET showing uptake in the tongue thought to be physiologic.

## 2022-08-26 NOTE — DISCHARGE SUMMARY
[facility] - Periop Services  Discharge Note  Short Stay    Procedure(s) (LRB):  REMOVAL, CATHETER, CENTRAL VENOUS, TUNNELED, WITH PORT removal mediprt (Right)    OUTCOME: Patient tolerated treatment/procedure well without complication and is now ready for discharge.    DISPOSITION: Home or Self Care    FINAL DIAGNOSIS:  Encounter for removal of tunneled central venous catheter (CVC) with port    FOLLOWUP: In clinic    DISCHARGE INSTRUCTIONS:    Discharge Procedure Orders   Diet Adult Regular     Lifting restrictions   Order Comments: No lifting more than 5 pounds for 2 weeks right arm     Notify your health care provider if you experience any of the following:  temperature >100.4     Notify your health care provider if you experience any of the following:  persistent nausea and vomiting or diarrhea     Notify your health care provider if you experience any of the following:  severe uncontrolled pain     Notify your health care provider if you experience any of the following:  redness, tenderness, or signs of infection (pain, swelling, redness, odor or green/yellow discharge around incision site)     Notify your health care provider if you experience any of the following:  difficulty breathing or increased cough     Notify your health care provider if you experience any of the following:  severe persistent headache     Notify your health care provider if you experience any of the following:  worsening rash     Notify your health care provider if you experience any of the following:  persistent dizziness, light-headedness, or visual disturbances     Notify your health care provider if you experience any of the following:  increased confusion or weakness     Notify your health care provider if you experience any of the following:     Activity as tolerated     Shower on day dressing removed (No bath)         Clinical Reference Documents Added to Patient Instructions       Document    PORTACATH REMOVAL  (ENGLISH)          TIME SPENT ON DISCHARGE: 4 minutes

## 2022-09-14 ENCOUNTER — LAB VISIT (OUTPATIENT)
Dept: LAB | Facility: HOSPITAL | Age: 62
End: 2022-09-14
Attending: INTERNAL MEDICINE
Payer: MEDICAID

## 2022-09-14 DIAGNOSIS — C77.0 METASTASIS TO CERVICAL LYMPH NODE: ICD-10-CM

## 2022-09-14 DIAGNOSIS — C01 CANCER OF BASE OF TONGUE: ICD-10-CM

## 2022-09-14 LAB
ALBUMIN SERPL-MCNC: 3.9 GM/DL (ref 3.4–4.8)
ALBUMIN/GLOB SERPL: 1.2 RATIO (ref 1.1–2)
ALP SERPL-CCNC: 85 UNIT/L (ref 40–150)
ALT SERPL-CCNC: 11 UNIT/L (ref 0–55)
AST SERPL-CCNC: 16 UNIT/L (ref 5–34)
BASOPHILS # BLD AUTO: 0.05 X10(3)/MCL (ref 0–0.2)
BASOPHILS NFR BLD AUTO: 1.2 %
BILIRUBIN DIRECT+TOT PNL SERPL-MCNC: 0.5 MG/DL
BUN SERPL-MCNC: 15 MG/DL (ref 8.4–25.7)
CALCIUM SERPL-MCNC: 9.6 MG/DL (ref 8.8–10)
CHLORIDE SERPL-SCNC: 109 MMOL/L (ref 98–107)
CO2 SERPL-SCNC: 25 MMOL/L (ref 23–31)
CREAT SERPL-MCNC: 1.44 MG/DL (ref 0.73–1.18)
EOSINOPHIL # BLD AUTO: 0.17 X10(3)/MCL (ref 0–0.9)
EOSINOPHIL NFR BLD AUTO: 4.2 %
ERYTHROCYTE [DISTWIDTH] IN BLOOD BY AUTOMATED COUNT: 13.4 % (ref 11.5–17)
GFR SERPLBLD CREATININE-BSD FMLA CKD-EPI: 55 MLS/MIN/1.73/M2
GLOBULIN SER-MCNC: 3.3 GM/DL (ref 2.4–3.5)
GLUCOSE SERPL-MCNC: 86 MG/DL (ref 82–115)
HCT VFR BLD AUTO: 42.4 % (ref 42–52)
HGB BLD-MCNC: 13.5 GM/DL (ref 14–18)
IMM GRANULOCYTES # BLD AUTO: 0.01 X10(3)/MCL (ref 0–0.04)
IMM GRANULOCYTES NFR BLD AUTO: 0.2 %
LYMPHOCYTES # BLD AUTO: 0.71 X10(3)/MCL (ref 0.6–4.6)
LYMPHOCYTES NFR BLD AUTO: 17.5 %
MAGNESIUM SERPL-MCNC: 1.9 MG/DL (ref 1.6–2.6)
MCH RBC QN AUTO: 30.4 PG (ref 27–31)
MCHC RBC AUTO-ENTMCNC: 31.8 MG/DL (ref 33–36)
MCV RBC AUTO: 95.5 FL (ref 80–94)
MONOCYTES # BLD AUTO: 0.46 X10(3)/MCL (ref 0.1–1.3)
MONOCYTES NFR BLD AUTO: 11.3 %
NEUTROPHILS # BLD AUTO: 2.7 X10(3)/MCL (ref 2.1–9.2)
NEUTROPHILS NFR BLD AUTO: 65.6 %
PLATELET # BLD AUTO: 156 X10(3)/MCL (ref 130–400)
PMV BLD AUTO: 10.4 FL (ref 7.4–10.4)
POTASSIUM SERPL-SCNC: 4.1 MMOL/L (ref 3.5–5.1)
PROT SERPL-MCNC: 7.2 GM/DL (ref 5.8–7.6)
RBC # BLD AUTO: 4.44 X10(6)/MCL (ref 4.7–6.1)
SODIUM SERPL-SCNC: 143 MMOL/L (ref 136–145)
WBC # SPEC AUTO: 4.1 X10(3)/MCL (ref 4.5–11.5)

## 2022-09-14 PROCEDURE — 80053 COMPREHEN METABOLIC PANEL: CPT

## 2022-09-14 PROCEDURE — 83735 ASSAY OF MAGNESIUM: CPT

## 2022-09-14 PROCEDURE — 36415 COLL VENOUS BLD VENIPUNCTURE: CPT

## 2022-09-14 PROCEDURE — 85025 COMPLETE CBC W/AUTO DIFF WBC: CPT

## 2022-09-20 ENCOUNTER — OFFICE VISIT (OUTPATIENT)
Dept: HEMATOLOGY/ONCOLOGY | Facility: CLINIC | Age: 62
End: 2022-09-20
Payer: MEDICAID

## 2022-09-20 VITALS
HEIGHT: 69 IN | OXYGEN SATURATION: 100 % | BODY MASS INDEX: 25.18 KG/M2 | RESPIRATION RATE: 18 BRPM | TEMPERATURE: 98 F | HEART RATE: 63 BPM | WEIGHT: 170 LBS | DIASTOLIC BLOOD PRESSURE: 78 MMHG | SYSTOLIC BLOOD PRESSURE: 133 MMHG

## 2022-09-20 DIAGNOSIS — C76.0 HEAD AND NECK CANCER: Primary | ICD-10-CM

## 2022-09-20 PROCEDURE — 1159F PR MEDICATION LIST DOCUMENTED IN MEDICAL RECORD: ICD-10-PCS | Mod: CPTII,,, | Performed by: NURSE PRACTITIONER

## 2022-09-20 PROCEDURE — 3075F PR MOST RECENT SYSTOLIC BLOOD PRESS GE 130-139MM HG: ICD-10-PCS | Mod: CPTII,,, | Performed by: NURSE PRACTITIONER

## 2022-09-20 PROCEDURE — 99999 PR PBB SHADOW E&M-EST. PATIENT-LVL IV: CPT | Mod: PBBFAC,,, | Performed by: NURSE PRACTITIONER

## 2022-09-20 PROCEDURE — 1159F MED LIST DOCD IN RCRD: CPT | Mod: CPTII,,, | Performed by: NURSE PRACTITIONER

## 2022-09-20 PROCEDURE — 3078F PR MOST RECENT DIASTOLIC BLOOD PRESSURE < 80 MM HG: ICD-10-PCS | Mod: CPTII,,, | Performed by: NURSE PRACTITIONER

## 2022-09-20 PROCEDURE — 3008F BODY MASS INDEX DOCD: CPT | Mod: CPTII,,, | Performed by: NURSE PRACTITIONER

## 2022-09-20 PROCEDURE — 99213 OFFICE O/P EST LOW 20 MIN: CPT | Mod: S$PBB,,, | Performed by: NURSE PRACTITIONER

## 2022-09-20 PROCEDURE — 3075F SYST BP GE 130 - 139MM HG: CPT | Mod: CPTII,,, | Performed by: NURSE PRACTITIONER

## 2022-09-20 PROCEDURE — 99213 PR OFFICE/OUTPT VISIT, EST, LEVL III, 20-29 MIN: ICD-10-PCS | Mod: S$PBB,,, | Performed by: NURSE PRACTITIONER

## 2022-09-20 PROCEDURE — 99214 OFFICE O/P EST MOD 30 MIN: CPT | Mod: PBBFAC | Performed by: NURSE PRACTITIONER

## 2022-09-20 PROCEDURE — 99999 PR PBB SHADOW E&M-EST. PATIENT-LVL IV: ICD-10-PCS | Mod: PBBFAC,,, | Performed by: NURSE PRACTITIONER

## 2022-09-20 PROCEDURE — 3008F PR BODY MASS INDEX (BMI) DOCUMENTED: ICD-10-PCS | Mod: CPTII,,, | Performed by: NURSE PRACTITIONER

## 2022-09-20 PROCEDURE — 3078F DIAST BP <80 MM HG: CPT | Mod: CPTII,,, | Performed by: NURSE PRACTITIONER

## 2022-09-20 NOTE — PROGRESS NOTES
"   [facility] Hematology/Oncology  PROGRESS NOTE      Subjective:         NAME: Esteban Martinez : 1960     61 y.o. male   MRN: [id number]    Referring Doc: No ref. provider found        Chief Complaint:    Chief Complaint   Patient presents with    CANCER OF BASE OF TONGUE     Pt reports no new issues or concerns.       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 Martinez is a 61 y.o. 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 Mr. Martinez presented with "lump in throat"    21  CT neck revealed  - bulky soft tissue in the region of the tongue base (3.5 x 3.1 cm) and palatine tonsillar fossa with partial occlusion of the left epiglottic valleculae and regional airway.    21 Dr. Navjot Gavin head and neck surgeon  in [place] , exam documents reveal "large exophytic mass filling the entire left side which appears to be emanating from the L glossotonsillar sulcus.  There is mass effect on the vallecula but no involvement of the vallecula.  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  Low-grade mediastinal lymph node activity suspect reactive    21 Started concurrent chemotherapy/radiation. Weekly cisplatin 40mg/m2. Delay in cycle # 4 on 10/12/2021(hospitalization - peg tube placement).     11/10/21 Completed weekly Cisplatin.      21 He was hospitalized  for dehydration.     21 Radiation completed.    1/10/22 He was seen in ER, for burning to the back of his tongue and throat " attributed to radiation. He underwent fiberoptic scope by Dr. Navjot Gavin in [place].    2/11/22 PET/CT  revealed no evidence of recurrence or metastatic diseases.  Tongue mass with no uptake but some uptake in neck. Planned to repeat in 3 months.     3/2/22 He was seen in the ER at [facility] with reports of abdominal pain, diagnosed with gastritis. CT Abdomen was normal.     5/2022 PET/CT  revealed intensity which appears to be physiologic activity of his tongue musculature. Resolving neck lymphadenopathy. PEG removed     7/2022 CT neck was negative for malignancy.      Interval History:  Mr. Martinez presents today  with his wife for follow-up. He reports feeling well. He was seen by radiation oncologist Dr. Borden today for fiberoptic scope which was essentially negative per the patient. He states he will be scheduled for another surveillance CT scan sometime in November, and will see Dr. Borden Nov 29th. He has minimal pain in the left side of his neck but denies any new lumps or adenopathy. He continues with lack of appetite and altered taste buds, but admits he is able to eat a but more and his taste buds are beginning to regenerate. He continues with dry mouth and uses Biotine PRN. He denies any difficulty with swallowing at this time. He reports head and neck surgeon Dr. Hodges has left his practice and will be replaced by Dr. Lyssa Cho. He will see the surgeon October 31st for follow-up. He will be discussing with his PCP about undergoing a screening colonoscopy soon.       ROS:   GEN: normal without any fever, night sweats or weight loss  HEENT: normal with no HA's, sore throat, stiff neck, changes in vision  CV: normal with no CP, +SOB with exertion, PND, JARRETT or orthopnea  PULM: normal with no SOB, cough, hemoptysis, sputum or pleuritic pain  GI: normal with no abdominal pain, nausea, vomiting, constipation, diarrhea, melanotic stools, BRBPR, or hematemesis  : normal with no hematuria,  "dysuria  BREAST: normal with no mass, discharge, pain  SKIN: normal with no rash, erythema, bruising, or swelling    Allergies:  Review of patient's allergies indicates:   Allergen Reactions    Codeine Other (See Comments)     Seizures    Tramadol Nausea And Vomiting    Acetaminophen Other (See Comments)     Extra strength    Propoxyphene n-acetaminophen Nausea And Vomiting       Medications:    Current Outpatient Medications:     levothyroxine (SYNTHROID) 50 MCG tablet, Take 50 mcg by mouth every morning., Disp: , Rfl:   No current facility-administered medications for this visit.    Facility-Administered Medications Ordered in Other Visits:     lactated ringers infusion, , Intravenous, Continuous, Daniel Antoine DO, Last Rate: 10 mL/hr at 06/13/22 0738, New Bag at 06/13/22 0738    PMHx/PSHx Updates:   Past Medical History:   Diagnosis Date    Anxiety     Cancer of base of tongue     Hyperlipidemia     Hypertension     Hyperthyroidism     Throat cancer      Past Surgical History:   Procedure Laterality Date    CATARACT EXTRACTION      CHOLECYSTECTOMY      EXPLORATION OF EAR      GASTROSTOMY TUBE PLACEMENT      MEDIPORT REMOVAL Right 6/13/2022    Procedure: REMOVAL, CATHETER, CENTRAL VENOUS, TUNNELED, WITH PORT removal mediprt;  Surgeon: ZAINAB Edwards MD;  Location: [facility];  Service: General;  Laterality: Right;    PEG TUBE REMOVAL      SHOULDER SURGERY Left        Family History:  Family History   Problem Relation Age of Onset    Colon cancer Mother     Brain cancer Father        Social History:  Social History     Socioeconomic History    Marital status:    Tobacco Use    Smoking status: Never    Smokeless tobacco: Never   Substance and Sexual Activity    Alcohol use: Never    Drug use: Never    Sexual activity: Yes         Objective:     ECOG SCORE             Vitals:  Blood pressure 133/78, pulse 63, temperature 97.5 °F (36.4 °C), resp. rate 18, height 5' 9" (1.753 m), weight 77.1 kg (169 lb " 15.6 oz), SpO2 100 %.    Physical Examination:   GEN: no apparent distress, comfortable; AAOx3  HEAD: atraumatic and normocephalic  EYES: no pallor, no icterus, PERRLA  ENT: OMM, no pharyngeal erythema, external ears WNL; no nasal discharge; no thrush  NECK: no masses, thyroid normal, trachea midline, no LAD/LN's, supple  CV: RRR with no murmur; normal pulse; normal S1 and S2; no pedal edema  CHEST: Normal respiratory effort; CTAB; normal breath sounds; no wheeze or crackles  ABDOM: nontender and nondistended; soft; normal bowel sounds; no rebound/guarding  MUSC/Skeletal: ROM normal; no crepitus; joints normal; no deformities or arthropathy  EXTREM: no clubbing, cyanosis, inflammation or swelling  SKIN: no rashes, lesions, ulcers, petechiae or subcutaneous nodules  : no valle  NEURO: grossly intact; motor/sensory WNL; AAOx3; no tremors  PSYCH: normal mood, affect and behavior  LYMPH: normal cervical, supraclavicular, axillary and groin LN's      Labs:   Lab Visit on 09/14/2022   Component Date Value Ref Range Status    Sodium Level 09/14/2022 143  136 - 145 mmol/L Final    Potassium Level 09/14/2022 4.1  3.5 - 5.1 mmol/L Final    Chloride 09/14/2022 109 (H)  98 - 107 mmol/L Final    Carbon Dioxide 09/14/2022 25  23 - 31 mmol/L Final    Glucose Level 09/14/2022 86  82 - 115 mg/dL Final    Blood Urea Nitrogen 09/14/2022 15.0  8.4 - 25.7 mg/dL Final    Creatinine 09/14/2022 1.44 (H)  0.73 - 1.18 mg/dL Final    Calcium Level Total 09/14/2022 9.6  8.8 - 10.0 mg/dL Final    Protein Total 09/14/2022 7.2  5.8 - 7.6 gm/dL Final    Albumin Level 09/14/2022 3.9  3.4 - 4.8 gm/dL Final    Globulin 09/14/2022 3.3  2.4 - 3.5 gm/dL Final    Albumin/Globulin Ratio 09/14/2022 1.2  1.1 - 2.0 ratio Final    Bilirubin Total 09/14/2022 0.5  <=1.5 mg/dL Final    Alkaline Phosphatase 09/14/2022 85  40 - 150 unit/L Final    Alanine Aminotransferase 09/14/2022 11  0 - 55 unit/L Final    Aspartate Aminotransferase 09/14/2022 16  5 - 34  "unit/L Final    eGFR 09/14/2022 55  mls/min/1.73/m2 Final    Magnesium Level 09/14/2022 1.90  1.60 - 2.60 mg/dL Final    WBC 09/14/2022 4.1 (L)  4.5 - 11.5 x10(3)/mcL Final    RBC 09/14/2022 4.44 (L)  4.70 - 6.10 x10(6)/mcL Final    Hgb 09/14/2022 13.5 (L)  14.0 - 18.0 gm/dL Final    Hct 09/14/2022 42.4  42.0 - 52.0 % Final    MCV 09/14/2022 95.5 (H)  80.0 - 94.0 fL Final    MCH 09/14/2022 30.4  27.0 - 31.0 pg Final    MCHC 09/14/2022 31.8 (L)  33.0 - 36.0 mg/dL Final    RDW 09/14/2022 13.4  11.5 - 17.0 % Final    Platelet 09/14/2022 156  130 - 400 x10(3)/mcL Final    MPV 09/14/2022 10.4  7.4 - 10.4 fL Final    Neut % 09/14/2022 65.6  % Final    Lymph % 09/14/2022 17.5  % Final    Mono % 09/14/2022 11.3  % Final    Eos % 09/14/2022 4.2  % Final    Basophil % 09/14/2022 1.2  % Final    Lymph # 09/14/2022 0.71  0.6 - 4.6 x10(3)/mcL Final    Neut # 09/14/2022 2.7  2.1 - 9.2 x10(3)/mcL Final    Mono # 09/14/2022 0.46  0.1 - 1.3 x10(3)/mcL Final    Eos # 09/14/2022 0.17  0 - 0.9 x10(3)/mcL Final    Baso # 09/14/2022 0.05  0 - 0.2 x10(3)/mcL Final    IG# 09/14/2022 0.01  0 - 0.04 x10(3)/mcL Final    IG% 09/14/2022 0.2  % Final       Radiology/Diagnostic Studies:    No results found.    I have reviewed all available lab results and radiology reports.    Assessment/Plan:     Impression and Plan   1. 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  - Dr. Navjot Gavin in [place] 8/25/21, exam documents "large exophytic mass filling the entire left side which appears to be emanating from the L glossotonsillar sulcus.  There is mass effect on the vallecula but no involvement of the vallecula.  There is also mass effect on the epiglottis but no involvement."     - 8/25/21 - L neck FNA - squamous cell carcinoma, p16 positive    - PET/CT " 9/8/21 ([facility])-hypermetabolic tongue base mass offset to the left with associated left level 2 cervical lymph node metastasis.  Low-grade mediastinal lymph node activity suspect reactive     Weekly cisplatin 40mg/m2. Delay in cycle # 4 on 10/12/2021(hospitalization - peg tube placement).    Cisplatin completed 11/10/21.  Patient completed radiation therapy on 11/16/2021.    PET/CT on 2/11/22 revealed no evidence of recurrence or metastatic diseases.       --Repeat PET/CT in May 2022 revealed intense what appears to be physiologic activity of his tongue musculature. Resolving neck lymphadenopathy.      CT Neck July 2022 revealed no evidence of recurrence or metastatic diseases.      Patient saw Dr Borden today with fiberoptic scope which essentially negative for recurrence per the patient. He will follow-up with Dr. Borden 11/29/22, and Surgeon Dr. Lyssa Cho 10/31/22. He states a follow-up scan has been ordered by Dr. Borden's office to be done in November. We will see him back with the results.     He was educated on continuous hydration atleast 64oz per day. He was encouraged to consume boost/ensure 2-3x daily, and to eat small amounts of food 6x per day to increase calorie intake. He was advised regarding the importance of eating healthy fruits and vegetables, and low processed food diet.      PLAN:  RTC in 3 mos. With MD for follow-up scan review.     Discussion:     I have explained all of the above in detail and the patient understands all of the current recommendation(s). I have answered all of their questions to the best of my ability and to their complete satisfaction.   The patient is to continue with the current management plan.            Electronically signed by Tamia Vang NP     Answers submitted by the patient for this visit:  Review of Systems Questionnaire (Submitted on 9/17/2022)  appetite change : Yes  unexpected weight change: No  mouth sores: No  visual disturbance: No  cough:  No  shortness of breath: Yes  chest pain: No  abdominal pain: No  diarrhea: No  frequency: No  back pain: Yes  rash: No  headaches: No  adenopathy: No  nervous/ anxious: Yes

## 2022-12-27 ENCOUNTER — INFUSION (OUTPATIENT)
Dept: INFUSION THERAPY | Facility: HOSPITAL | Age: 62
End: 2022-12-27
Attending: NURSE PRACTITIONER
Payer: MEDICAID

## 2022-12-27 ENCOUNTER — OFFICE VISIT (OUTPATIENT)
Dept: HEMATOLOGY/ONCOLOGY | Facility: CLINIC | Age: 62
End: 2022-12-27
Payer: MEDICAID

## 2022-12-27 VITALS
RESPIRATION RATE: 18 BRPM | OXYGEN SATURATION: 99 % | HEART RATE: 55 BPM | HEART RATE: 55 BPM | HEIGHT: 70 IN | BODY MASS INDEX: 24.6 KG/M2 | SYSTOLIC BLOOD PRESSURE: 132 MMHG | DIASTOLIC BLOOD PRESSURE: 82 MMHG | HEIGHT: 70 IN | RESPIRATION RATE: 18 BRPM | TEMPERATURE: 98 F | OXYGEN SATURATION: 99 % | DIASTOLIC BLOOD PRESSURE: 82 MMHG | BODY MASS INDEX: 24.6 KG/M2 | TEMPERATURE: 98 F | SYSTOLIC BLOOD PRESSURE: 132 MMHG | WEIGHT: 171.81 LBS | WEIGHT: 171.81 LBS

## 2022-12-27 DIAGNOSIS — C76.0 HEAD AND NECK CANCER: Primary | ICD-10-CM

## 2022-12-27 DIAGNOSIS — R79.89 HIGH CREATININE: Primary | ICD-10-CM

## 2022-12-27 PROCEDURE — 1159F MED LIST DOCD IN RCRD: CPT | Mod: CPTII,,, | Performed by: NURSE PRACTITIONER

## 2022-12-27 PROCEDURE — 3079F DIAST BP 80-89 MM HG: CPT | Mod: CPTII,,, | Performed by: NURSE PRACTITIONER

## 2022-12-27 PROCEDURE — 3079F PR MOST RECENT DIASTOLIC BLOOD PRESSURE 80-89 MM HG: ICD-10-PCS | Mod: CPTII,,, | Performed by: NURSE PRACTITIONER

## 2022-12-27 PROCEDURE — 99214 PR OFFICE/OUTPT VISIT, EST, LEVL IV, 30-39 MIN: ICD-10-PCS | Mod: S$PBB,,, | Performed by: NURSE PRACTITIONER

## 2022-12-27 PROCEDURE — 1159F PR MEDICATION LIST DOCUMENTED IN MEDICAL RECORD: ICD-10-PCS | Mod: CPTII,,, | Performed by: NURSE PRACTITIONER

## 2022-12-27 PROCEDURE — 3008F PR BODY MASS INDEX (BMI) DOCUMENTED: ICD-10-PCS | Mod: CPTII,,, | Performed by: NURSE PRACTITIONER

## 2022-12-27 PROCEDURE — 99999 PR PBB SHADOW E&M-EST. PATIENT-LVL IV: CPT | Mod: PBBFAC,,, | Performed by: NURSE PRACTITIONER

## 2022-12-27 PROCEDURE — 96360 HYDRATION IV INFUSION INIT: CPT

## 2022-12-27 PROCEDURE — 99214 OFFICE O/P EST MOD 30 MIN: CPT | Mod: S$PBB,,, | Performed by: NURSE PRACTITIONER

## 2022-12-27 PROCEDURE — 3075F SYST BP GE 130 - 139MM HG: CPT | Mod: CPTII,,, | Performed by: NURSE PRACTITIONER

## 2022-12-27 PROCEDURE — 3008F BODY MASS INDEX DOCD: CPT | Mod: CPTII,,, | Performed by: NURSE PRACTITIONER

## 2022-12-27 PROCEDURE — 99999 PR PBB SHADOW E&M-EST. PATIENT-LVL IV: ICD-10-PCS | Mod: PBBFAC,,, | Performed by: NURSE PRACTITIONER

## 2022-12-27 PROCEDURE — 3075F PR MOST RECENT SYSTOLIC BLOOD PRESS GE 130-139MM HG: ICD-10-PCS | Mod: CPTII,,, | Performed by: NURSE PRACTITIONER

## 2022-12-27 PROCEDURE — 25000003 PHARM REV CODE 250: Performed by: NURSE PRACTITIONER

## 2022-12-27 PROCEDURE — 1160F RVW MEDS BY RX/DR IN RCRD: CPT | Mod: CPTII,,, | Performed by: NURSE PRACTITIONER

## 2022-12-27 PROCEDURE — 99214 OFFICE O/P EST MOD 30 MIN: CPT | Mod: PBBFAC,25 | Performed by: NURSE PRACTITIONER

## 2022-12-27 PROCEDURE — 1160F PR REVIEW ALL MEDS BY PRESCRIBER/CLIN PHARMACIST DOCUMENTED: ICD-10-PCS | Mod: CPTII,,, | Performed by: NURSE PRACTITIONER

## 2022-12-27 RX ORDER — LEVOTHYROXINE SODIUM 75 UG/1
75 TABLET ORAL EVERY MORNING
COMMUNITY
Start: 2022-12-15

## 2022-12-27 RX ORDER — HEPARIN 100 UNIT/ML
500 SYRINGE INTRAVENOUS
Status: CANCELLED | OUTPATIENT
Start: 2022-12-27

## 2022-12-27 RX ORDER — HEPARIN 100 UNIT/ML
500 SYRINGE INTRAVENOUS
OUTPATIENT
Start: 2022-12-27

## 2022-12-27 RX ADMIN — SODIUM CHLORIDE 1000 ML: 9 INJECTION, SOLUTION INTRAVENOUS at 11:12

## 2022-12-27 NOTE — PROGRESS NOTES
"   [facility] Hematology/Oncology  PROGRESS NOTE      Subjective:         NAME: Esteban Martinez : 1960     62 y.o. male   MRN: [id number]    Referring Doc: No ref. provider found        Chief Complaint:    Chief Complaint   Patient presents with    tongue cancer     Pt reports no new issues or concerns.        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 Martinez is a 61 y.o. 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 Mr. Martinez presented with "lump in throat"    21  CT neck revealed  - bulky soft tissue in the region of the tongue base (3.5 x 3.1 cm) and palatine tonsillar fossa with partial occlusion of the left epiglottic valleculae and regional airway.    21 Dr. Navjot Gavin head and neck surgeon  in [place] , exam documents reveal "large exophytic mass filling the entire left side which appears to be emanating from the L glossotonsillar sulcus.  There is mass effect on the vallecula but no involvement of the vallecula.  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  Low-grade mediastinal lymph node activity suspect reactive    21 Started concurrent chemotherapy/radiation. Weekly cisplatin 40mg/m2. Delay in cycle # 4 on 10/12/2021(hospitalization - peg tube placement).     11/10/21 Completed weekly Cisplatin.      21 He was hospitalized  for dehydration.     21 Radiation completed.    1/10/22 He was seen in ER, for burning to the back of his tongue and throat attributed " to radiation. He underwent fiberoptic scope by Dr. Navjot Gavin in [place].    2/11/22 PET/CT  revealed no evidence of recurrence or metastatic diseases.  Tongue mass with no uptake but some uptake in neck. Planned to repeat in 3 months.     3/2/22 He was seen in the ER at [facility] with reports of abdominal pain, diagnosed with gastritis. CT Abdomen was normal.     5/2022 PET/CT  revealed intensity which appears to be physiologic activity of his tongue musculature. Resolving neck lymphadenopathy. PEG removed     7/2022 CT neck was negative for malignancy.      Interval History:  Mr. Martinez presents today  with his wife for follow-up. He reports feeling well. He was seen by radiation oncologist Dr. Borden and CT will be arranged sometime in April per the patient. He reports head and neck surgeon Dr. Hodges has left his practice and was replaced by Dr. Lyssa Cho, whom he saw October 31st for follow-up and fiberoptic EGD scope which was essentially negative.    He denies any new lumps or adenopathy in his neck. His appetite and taste buds are improving. His energy is improving.  His dry mouth has improved and uses Biotine PRN. He denies any difficulty with swallowing at this time. He will be discussing with his PCP about undergoing a screening colonoscopy soon. He admits he has not been drinking enough water as it causes him to have acid reflux. He does not have a renal specialist. He was given amoxicillin and prednisone 2 weeks ago for sinus infection. He denies any fevers at this time and feels better.       ROS:   GEN: normal without any fever, night sweats or weight loss  HEENT: normal with no HA's, sore throat, stiff neck, changes in vision  CV: normal with no CP, +SOB with exertion, PND, JARRETT or orthopnea  PULM: normal with no SOB, cough, hemoptysis, sputum or pleuritic pain  GI: normal with no abdominal pain, nausea, vomiting, constipation, diarrhea, melanotic stools, BRBPR, or hematemesis  : normal with no  "hematuria, dysuria  BREAST: normal with no mass, discharge, pain  SKIN: normal with no rash, erythema, bruising, or swelling    Allergies:  Review of patient's allergies indicates:   Allergen Reactions    Codeine Other (See Comments)     Seizures    Tramadol Nausea And Vomiting    Acetaminophen Other (See Comments)     Extra strength    Propoxyphene n-acetaminophen Nausea And Vomiting       Medications:    Current Outpatient Medications:     levothyroxine (SYNTHROID) 75 MCG tablet, Take 75 mcg by mouth every morning., Disp: , Rfl:   No current facility-administered medications for this visit.    Facility-Administered Medications Ordered in Other Visits:     lactated ringers infusion, , Intravenous, Continuous, Daniel Antoine DO, Last Rate: 10 mL/hr at 06/13/22 0738, New Bag at 06/13/22 0738    PMHx/PSHx Updates:   Past Medical History:   Diagnosis Date    Anxiety     Cancer of base of tongue     Hyperlipidemia     Hypertension     Hyperthyroidism     Throat cancer      Past Surgical History:   Procedure Laterality Date    CATARACT EXTRACTION      CHOLECYSTECTOMY      EXPLORATION OF EAR      GASTROSTOMY TUBE PLACEMENT      MEDIPORT REMOVAL Right 6/13/2022    Procedure: REMOVAL, CATHETER, CENTRAL VENOUS, TUNNELED, WITH PORT removal mediprt;  Surgeon: ZAINAB Edwards MD;  Location: [facility];  Service: General;  Laterality: Right;    PEG TUBE REMOVAL      SHOULDER SURGERY Left        Family History:  Family History   Problem Relation Age of Onset    Colon cancer Mother     Brain cancer Father        Social History:  Social History     Socioeconomic History    Marital status:    Tobacco Use    Smoking status: Never    Smokeless tobacco: Never   Substance and Sexual Activity    Alcohol use: Never    Drug use: Never    Sexual activity: Yes         Objective:     ECOG SCORE             Vitals:  Blood pressure 132/82, pulse (!) 55, temperature 98.2 °F (36.8 °C), resp. rate 18, height 5' 10" (1.778 m), weight " 77.9 kg (171 lb 12.8 oz), SpO2 99 %.    Physical Examination:   GEN: no apparent distress, comfortable; AAOx3  HEAD: atraumatic and normocephalic  EYES: no pallor, no icterus, PERRLA  ENT: OMM, no pharyngeal erythema, external ears WNL; no nasal discharge; no thrush  NECK: no masses, thyroid normal, trachea midline, no LAD/LN's, supple  CV: RRR with no murmur; normal pulse; normal S1 and S2; no pedal edema  CHEST: Normal respiratory effort; CTAB; normal breath sounds; no wheeze or crackles  ABDOM: nontender and nondistended; soft; normal bowel sounds; no rebound/guarding  MUSC/Skeletal: ROM normal; no crepitus; joints normal; no deformities or arthropathy  EXTREM: no clubbing, cyanosis, inflammation or swelling  SKIN: no rashes, lesions, ulcers, petechiae or subcutaneous nodules  : no valle  NEURO: grossly intact; motor/sensory WNL; AAOx3; no tremors  PSYCH: normal mood, affect and behavior  LYMPH: normal cervical, supraclavicular, axillary and groin LN's      Labs:   Lab Visit on 12/27/2022   Component Date Value Ref Range Status    Sodium Level 12/27/2022 140  136 - 145 mmol/L Final    Potassium Level 12/27/2022 4.4  3.5 - 5.1 mmol/L Final    Chloride 12/27/2022 106  98 - 107 mmol/L Final    Carbon Dioxide 12/27/2022 24  23 - 31 mmol/L Final    Glucose Level 12/27/2022 97  82 - 115 mg/dL Final    Blood Urea Nitrogen 12/27/2022 24.0  8.4 - 25.7 mg/dL Final    Creatinine 12/27/2022 1.67 (H)  0.73 - 1.18 mg/dL Final    Calcium Level Total 12/27/2022 9.7  8.8 - 10.0 mg/dL Final    Protein Total 12/27/2022 7.4  5.8 - 7.6 gm/dL Final    Albumin Level 12/27/2022 4.2  3.4 - 4.8 g/dL Final    Globulin 12/27/2022 3.2  2.4 - 3.5 gm/dL Final    Albumin/Globulin Ratio 12/27/2022 1.3  1.1 - 2.0 ratio Final    Bilirubin Total 12/27/2022 1.0  <=1.5 mg/dL Final    Alkaline Phosphatase 12/27/2022 91  40 - 150 unit/L Final    Alanine Aminotransferase 12/27/2022 29  0 - 55 unit/L Final    Aspartate Aminotransferase 12/27/2022 29  5  "- 34 unit/L Final    eGFR 12/27/2022 45  mls/min/1.73/m2 Final    WBC 12/27/2022 3.6 (L)  4.5 - 11.5 x10(3)/mcL Final    RBC 12/27/2022 4.90  4.70 - 6.10 x10(6)/mcL Final    Hgb 12/27/2022 14.9  14.0 - 18.0 gm/dL Final    Hct 12/27/2022 46.5  42.0 - 52.0 % Final    MCV 12/27/2022 94.9 (H)  80.0 - 94.0 fL Final    MCH 12/27/2022 30.4  pg Final    MCHC 12/27/2022 32.0 (L)  33.0 - 36.0 mg/dL Final    RDW 12/27/2022 13.1  11.6 - 14.4 % Final    Platelet 12/27/2022 179  140 - 371 x10(3)/mcL Final    MPV 12/27/2022 9.4  9.4 - 12.4 fL Final    Neut % 12/27/2022 66.2  % Final    Lymph % 12/27/2022 18.3  % Final    Mono % 12/27/2022 9.1  % Final    Eos % 12/27/2022 4.4  % Final    Basophil % 12/27/2022 1.7  % Final    Lymph # 12/27/2022 0.66  0.6 - 4.6 x10(3)/mcL Final    Neut # 12/27/2022 2.39  2.1 - 9.2 x10(3)/mcL Final    Mono # 12/27/2022 0.33  0.1 - 1.3 x10(3)/mcL Final    Eos # 12/27/2022 0.16  0 - 0.9 x10(3)/mcL Final    Baso # 12/27/2022 0.06  0 - 0.2 x10(3)/mcL Final    IG# 12/27/2022 0.01  0 - 0.04 x10(3)/mcL Final    IG% 12/27/2022 0.3  % Final       Radiology/Diagnostic Studies:    No results found.    I have reviewed all available lab results and radiology reports.    Assessment/Plan:     Impression and Plan   1. 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  - Dr. Navjot Gavin in [place] 8/25/21, exam documents "large exophytic mass filling the entire left side which appears to be emanating from the L glossotonsillar sulcus.  There is mass effect on the vallecula but no involvement of the vallecula.  There is also mass effect on the epiglottis but no involvement."     - 8/25/21 - L neck FNA - squamous cell carcinoma, p16 positive    - PET/CT 9/8/21 ([facility])-hypermetabolic tongue base mass offset to the left with " associated left level 2 cervical lymph node metastasis.  Low-grade mediastinal lymph node activity suspect reactive     Weekly cisplatin 40mg/m2. Delay in cycle # 4 on 10/12/2021(hospitalization - peg tube placement).    Cisplatin completed 11/10/21.  Patient completed radiation therapy on 11/16/2021.    PET/CT on 2/11/22 revealed no evidence of recurrence or metastatic diseases.       --Repeat PET/CT in May 2022 revealed intense what appears to be physiologic activity of his tongue musculature. Resolving neck lymphadenopathy.      CT Neck July 2022 revealed no evidence of recurrence or metastatic diseases.      Patient saw Surgeon Dr. Lyssa Cho 10/31/22 and fiberoptic scope was negative for recurrence, he will follow-up January 2023. He will follow-up with Dr. Borden March 2023 and they will determine next scan date per the patient.     Per NCCN Guidelines:    H&P exam (including a complete head and neck exam; and mirror and fiberoptic examination):  Year 1, every 1-3 mo  Year 2, every 2-6 mo  Years 3-5, every 4-8 mo  >5 years, every 12 mo    Hypothyroidism: Thyroid labs monitored by PCP, recently adjusted to synthroid to 75 mcg     He was educated on continuous hydration atleast 64oz per day. He was encouraged to consume boost/ensure 2-3x daily, and to eat small amounts of food 6x per day to increase calorie intake. He was advised regarding the importance of eating healthy fruits and vegetables, and low processed food diet.      Chronic renal insufficiency: His creatinine is elevated at 1.6 today, he has been noted with elevated creatinine levels over the past 1 year.  We will refer him to nephrology to establish for monitoring. I will also give him 1L NS, as he finds it challenging to intake water/fluids.     PLAN:  RTC in 4 mos. With MD for follow-up     Discussion:     I have explained all of the above in detail and the patient understands all of the current recommendation(s). I have answered all of their  questions to the best of my ability and to their complete satisfaction.   The patient is to continue with the current management plan.            Electronically signed by Tamia Vang NP        Answers submitted by the patient for this visit:  Review of Systems Questionnaire (Submitted on 12/20/2022)  appetite change : No  unexpected weight change: No  mouth sores: No  visual disturbance: No  cough: No  shortness of breath: No  chest pain: No  abdominal pain: No  diarrhea: No  frequency: No  back pain: No  rash: No  headaches: No  adenopathy: No  nervous/ anxious: No

## 2023-01-10 ENCOUNTER — DOCUMENTATION ONLY (OUTPATIENT)
Dept: HEMATOLOGY/ONCOLOGY | Facility: CLINIC | Age: 63
End: 2023-01-10
Payer: MEDICAID

## 2023-04-26 NOTE — PROGRESS NOTES
"   [facility] Hematology/Oncology  PROGRESS NOTE      Subjective:         NAME: Esteban Martinez : 1960     62 y.o. male   MRN: [id number]    Referring Doc: No ref. provider found        Chief Complaint:    No chief complaint on file.      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 Martinez is a 61 y.o. 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 Mr. Martinez presented with "lump in throat"    21  CT neck revealed  - bulky soft tissue in the region of the tongue base (3.5 x 3.1 cm) and palatine tonsillar fossa with partial occlusion of the left epiglottic valleculae and regional airway.    21 Dr. Navjot Gavin head and neck surgeon  in [place] , exam documents reveal "large exophytic mass filling the entire left side which appears to be emanating from the L glossotonsillar sulcus.  There is mass effect on the vallecula but no involvement of the vallecula.  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  Low-grade mediastinal lymph node activity suspect reactive    21 Started concurrent chemotherapy/radiation. Weekly cisplatin 40mg/m2. Delay in cycle # 4 on 10/12/2021(hospitalization - peg tube placement).     11/10/21 Completed weekly Cisplatin.      21 He was hospitalized  for dehydration.     21 Radiation completed.    1/10/22 He was seen in ER, for burning to the back of his tongue and throat attributed to radiation. He underwent fiberoptic scope by Dr. Navjot Gavin in [facility] [place].    2/11/22 PET/CT  revealed no evidence of recurrence or metastatic diseases.  Tongue mass with no uptake but some uptake in neck. Planned to repeat in 3 months.     3/2/22 He was seen in the ER at [facility] with reports of abdominal pain, diagnosed with gastritis. CT Abdomen was normal.     5/2022 PET/CT  revealed intensity which appears to be physiologic activity of his tongue musculature. Resolving neck lymphadenopathy. PEG removed     7/2022 CT neck was negative for malignancy.      Interval History:  Mr. Martinez presents today  with his wife for follow-up. He reports feeling well. He was seen by radiation oncologist Dr. Borden and CT will be arranged sometime in April per the patient. He reports head and neck surgeon Dr. Hodges has left his practice and was replaced by Dr. Lyssa Cho, whom he saw October 31st for follow-up and fiberoptic EGD scope which was essentially negative.    Patient continues to do well with no complaints.   Denies any weight loss, fatigue, pain, shortness of breath, changes in swallowing.     ROS:   GEN: normal without any fever, night sweats or weight loss  HEENT: normal with no HA's, sore throat, stiff neck, changes in vision  CV: normal with no CP, +SOB with exertion, PND, JARRETT or orthopnea  PULM: normal with no SOB, cough, hemoptysis, sputum or pleuritic pain  GI: normal with no abdominal pain, nausea, vomiting, constipation, diarrhea, melanotic stools, BRBPR, or hematemesis  : normal with no hematuria, dysuria  BREAST: normal with no mass, discharge, pain  SKIN: normal with no rash, erythema, bruising, or swelling    Allergies:  Review of patient's allergies indicates:   Allergen Reactions    Codeine Other (See Comments)     Seizures    Tramadol Nausea And Vomiting    Acetaminophen Other (See Comments)     Extra strength    Propoxyphene n-acetaminophen Nausea And Vomiting       Medications:    Current Outpatient Medications:     levothyroxine (SYNTHROID) 75 MCG tablet, Take 75  mcg by mouth every morning., Disp: , Rfl:   No current facility-administered medications for this visit.    Facility-Administered Medications Ordered in Other Visits:     lactated ringers infusion, , Intravenous, Continuous, Daniel Antoine DO, Last Rate: 10 mL/hr at 06/13/22 0738, New Bag at 06/13/22 0738    PMHx/PSHx Updates:   Past Medical History:   Diagnosis Date    Anxiety     Cancer of base of tongue     Hyperlipidemia     Hypertension     Hyperthyroidism     Throat cancer      Past Surgical History:   Procedure Laterality Date    CATARACT EXTRACTION      CHOLECYSTECTOMY      EXPLORATION OF EAR      GASTROSTOMY TUBE PLACEMENT      MEDIPORT REMOVAL Right 6/13/2022    Procedure: REMOVAL, CATHETER, CENTRAL VENOUS, TUNNELED, WITH PORT removal mediprt;  Surgeon: ZAINAB Edwards MD;  Location: [facility];  Service: General;  Laterality: Right;    PEG TUBE REMOVAL      SHOULDER SURGERY Left        Family History:  Family History   Problem Relation Age of Onset    Colon cancer Mother     Brain cancer Father        Social History:  Social History     Socioeconomic History    Marital status:    Tobacco Use    Smoking status: Never    Smokeless tobacco: Never   Substance and Sexual Activity    Alcohol use: Never    Drug use: Never    Sexual activity: Yes         Objective:     ECOG SCORE             Vitals:  There were no vitals taken for this visit.    Physical Examination:   GEN: no apparent distress, comfortable; AAOx3  HEAD: atraumatic and normocephalic  EYES: no pallor, no icterus, PERRLA  ENT: OMM, no pharyngeal erythema, external ears WNL; no nasal discharge; no thrush  NECK: no masses, thyroid normal, trachea midline, no LAD/LN's, supple  CV: RRR with no murmur; normal pulse; normal S1 and S2; no pedal edema  CHEST: Normal respiratory effort; CTAB; normal breath sounds; no wheeze or crackles  ABDOM: nontender and nondistended; soft; normal bowel sounds; no rebound/guarding  MUSC/Skeletal: ROM  normal; no crepitus; joints normal; no deformities or arthropathy  EXTREM: no clubbing, cyanosis, inflammation or swelling  SKIN: no rashes, lesions, ulcers, petechiae or subcutaneous nodules  : no valle  NEURO: grossly intact; motor/sensory WNL; AAOx3; no tremors  PSYCH: normal mood, affect and behavior  LYMPH: normal cervical, supraclavicular, axillary and groin LN's      Labs:   No visits with results within 1 Week(s) from this visit.   Latest known visit with results is:   Lab Visit on 12/27/2022   Component Date Value Ref Range Status    Sodium Level 12/27/2022 140  136 - 145 mmol/L Final    Potassium Level 12/27/2022 4.4  3.5 - 5.1 mmol/L Final    Chloride 12/27/2022 106  98 - 107 mmol/L Final    Carbon Dioxide 12/27/2022 24  23 - 31 mmol/L Final    Glucose Level 12/27/2022 97  82 - 115 mg/dL Final    Blood Urea Nitrogen 12/27/2022 24.0  8.4 - 25.7 mg/dL Final    Creatinine 12/27/2022 1.67 (H)  0.73 - 1.18 mg/dL Final    Calcium Level Total 12/27/2022 9.7  8.8 - 10.0 mg/dL Final    Protein Total 12/27/2022 7.4  5.8 - 7.6 gm/dL Final    Albumin Level 12/27/2022 4.2  3.4 - 4.8 g/dL Final    Globulin 12/27/2022 3.2  2.4 - 3.5 gm/dL Final    Albumin/Globulin Ratio 12/27/2022 1.3  1.1 - 2.0 ratio Final    Bilirubin Total 12/27/2022 1.0  <=1.5 mg/dL Final    Alkaline Phosphatase 12/27/2022 91  40 - 150 unit/L Final    Alanine Aminotransferase 12/27/2022 29  0 - 55 unit/L Final    Aspartate Aminotransferase 12/27/2022 29  5 - 34 unit/L Final    eGFR 12/27/2022 45  mls/min/1.73/m2 Final    WBC 12/27/2022 3.6 (L)  4.5 - 11.5 x10(3)/mcL Final    RBC 12/27/2022 4.90  4.70 - 6.10 x10(6)/mcL Final    Hgb 12/27/2022 14.9  14.0 - 18.0 gm/dL Final    Hct 12/27/2022 46.5  42.0 - 52.0 % Final    MCV 12/27/2022 94.9 (H)  80.0 - 94.0 fL Final    MCH 12/27/2022 30.4  pg Final    MCHC 12/27/2022 32.0 (L)  33.0 - 36.0 mg/dL Final    RDW 12/27/2022 13.1  11.6 - 14.4 % Final    Platelet 12/27/2022 179  140 - 371 x10(3)/mcL Final     "MPV 12/27/2022 9.4  9.4 - 12.4 fL Final    Neut % 12/27/2022 66.2  % Final    Lymph % 12/27/2022 18.3  % Final    Mono % 12/27/2022 9.1  % Final    Eos % 12/27/2022 4.4  % Final    Basophil % 12/27/2022 1.7  % Final    Lymph # 12/27/2022 0.66  0.6 - 4.6 x10(3)/mcL Final    Neut # 12/27/2022 2.39  2.1 - 9.2 x10(3)/mcL Final    Mono # 12/27/2022 0.33  0.1 - 1.3 x10(3)/mcL Final    Eos # 12/27/2022 0.16  0 - 0.9 x10(3)/mcL Final    Baso # 12/27/2022 0.06  0 - 0.2 x10(3)/mcL Final    IG# 12/27/2022 0.01  0 - 0.04 x10(3)/mcL Final    IG% 12/27/2022 0.3  % Final       Radiology/Diagnostic Studies:    No results found.    I have reviewed all available lab results and radiology reports.    Assessment/Plan:     Impression and Plan   1. 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  - Dr. Navjot Gavin in [place] 8/25/21, exam documents "large exophytic mass filling the entire left side which appears to be emanating from the L glossotonsillar sulcus.  There is mass effect on the vallecula but no involvement of the vallecula.  There is also mass effect on the epiglottis but no involvement."     - 8/25/21 - L neck FNA - squamous cell carcinoma, p16 positive    - PET/CT 9/8/21 ([facility])-hypermetabolic tongue base mass offset to the left with associated left level 2 cervical lymph node metastasis.  Low-grade mediastinal lymph node activity suspect reactive     Weekly cisplatin 40mg/m2. Delay in cycle # 4 on 10/12/2021(hospitalization - peg tube placement).    Cisplatin completed 11/10/21.  Patient completed radiation therapy on 11/16/2021.    PET/CT on 2/11/22 revealed no evidence of recurrence or metastatic diseases.       --Repeat PET/CT in May 2022 revealed intense what appears to be physiologic activity of his tongue musculature. " Resolving neck lymphadenopathy.      CT Neck July 2022 revealed no evidence of recurrence or metastatic diseases.      Patient saw Surgeon Dr. Lyssa Cho 10/31/22 and fiberoptic scope was negative for recurrence, he will follow-up January 2023. He will follow-up with Dr. Borden March 2023 and they will determine next scan date per the patient.     Per NCCN Guidelines:    H&P exam (including a complete head and neck exam; and mirror and fiberoptic examination):  Year 1, every 1-3 mo  Year 2, every 2-6 mo  Years 3-5, every 4-8 mo  >5 years, every 12 mo    Hypothyroidism: Thyroid labs monitored by PCP, recently adjusted to synthroid to 75 mcg     He was educated on continuous hydration atleast 64oz per day. He was encouraged to consume boost/ensure 2-3x daily, and to eat small amounts of food 6x per day to increase calorie intake. He was advised regarding the importance of eating healthy fruits and vegetables, and low processed food diet.      Chronic renal insufficiency: His creatinine is elevated at 1.6 today, he has been noted with elevated creatinine levels over the past 1 year.  We will refer him to nephrology to establish for monitoring. I will also give him 1L NS, as he finds it challenging to intake water/fluids.     PLAN:  RTC in 4 mos for NP follow up.   Needs scans prior - will discuss with Dr. Borden about which scans and who will order them.     Discussion:     The patient was seen, interviewed and examined. Pertinent lab and radiology studies were reviewed. Pt instructed to call should develop concerning signs/symptoms or have further questions.        Portions of the record may have been created with voice recognition software. Occasional wrong-word or sound-a-like substitutions may have occurred due to the inherent limitations of voice recognition software. Read the chart carefully and recognize, using context, where substitutions have occurred.     Elizabeth LeJeune, MD  Hematology/Oncology             Answers submitted by the patient for this visit:  Review of Systems Questionnaire (Submitted on 12/20/2022)  appetite change : No  unexpected weight change: No  mouth sores: No  visual disturbance: No  cough: No  shortness of breath: No  chest pain: No  abdominal pain: No  diarrhea: No  frequency: No  back pain: No  rash: No  headaches: No  adenopathy: No  nervous/ anxious: No

## 2023-04-27 ENCOUNTER — LAB VISIT (OUTPATIENT)
Dept: LAB | Facility: HOSPITAL | Age: 63
End: 2023-04-27
Attending: NURSE PRACTITIONER
Payer: MEDICAID

## 2023-04-27 ENCOUNTER — OFFICE VISIT (OUTPATIENT)
Dept: HEMATOLOGY/ONCOLOGY | Facility: CLINIC | Age: 63
End: 2023-04-27
Payer: MEDICAID

## 2023-04-27 VITALS
SYSTOLIC BLOOD PRESSURE: 149 MMHG | OXYGEN SATURATION: 99 % | BODY MASS INDEX: 24.91 KG/M2 | WEIGHT: 174 LBS | TEMPERATURE: 98 F | RESPIRATION RATE: 18 BRPM | HEART RATE: 72 BPM | HEIGHT: 70 IN | DIASTOLIC BLOOD PRESSURE: 83 MMHG

## 2023-04-27 DIAGNOSIS — C76.0 HEAD AND NECK CANCER: Primary | ICD-10-CM

## 2023-04-27 DIAGNOSIS — C76.0 HEAD AND NECK CANCER: ICD-10-CM

## 2023-04-27 DIAGNOSIS — C01 CANCER OF BASE OF TONGUE: Primary | ICD-10-CM

## 2023-04-27 LAB
ALBUMIN SERPL-MCNC: 4.5 G/DL (ref 3.4–4.8)
ALBUMIN/GLOB SERPL: 1.4 RATIO (ref 1.1–2)
ALP SERPL-CCNC: 101 UNIT/L (ref 40–150)
ALT SERPL-CCNC: 19 UNIT/L (ref 0–55)
AST SERPL-CCNC: 24 UNIT/L (ref 5–34)
BASOPHILS # BLD AUTO: 0.07 X10(3)/MCL (ref 0–0.2)
BASOPHILS NFR BLD AUTO: 1.5 %
BILIRUBIN DIRECT+TOT PNL SERPL-MCNC: 0.7 MG/DL
BUN SERPL-MCNC: 30 MG/DL (ref 8.4–25.7)
CALCIUM SERPL-MCNC: 10.2 MG/DL (ref 8.8–10)
CHLORIDE SERPL-SCNC: 104 MMOL/L (ref 98–107)
CO2 SERPL-SCNC: 24 MMOL/L (ref 23–31)
CREAT SERPL-MCNC: 1.68 MG/DL (ref 0.73–1.18)
EOSINOPHIL # BLD AUTO: 0.46 X10(3)/MCL (ref 0–0.9)
EOSINOPHIL NFR BLD AUTO: 9.9 %
ERYTHROCYTE [DISTWIDTH] IN BLOOD BY AUTOMATED COUNT: 13.2 % (ref 11.5–17)
GFR SERPLBLD CREATININE-BSD FMLA CKD-EPI: 45 MLS/MIN/1.73/M2
GLOBULIN SER-MCNC: 3.3 GM/DL (ref 2.4–3.5)
GLUCOSE SERPL-MCNC: 95 MG/DL (ref 82–115)
HCT VFR BLD AUTO: 47 % (ref 42–52)
HGB BLD-MCNC: 15.5 G/DL (ref 14–18)
IMM GRANULOCYTES # BLD AUTO: 0.01 X10(3)/MCL (ref 0–0.04)
IMM GRANULOCYTES NFR BLD AUTO: 0.2 %
LYMPHOCYTES # BLD AUTO: 0.81 X10(3)/MCL (ref 0.6–4.6)
LYMPHOCYTES NFR BLD AUTO: 17.4 %
MCH RBC QN AUTO: 29.8 PG (ref 27–31)
MCHC RBC AUTO-ENTMCNC: 33 G/DL (ref 33–36)
MCV RBC AUTO: 90.4 FL (ref 80–94)
MONOCYTES # BLD AUTO: 0.47 X10(3)/MCL (ref 0.1–1.3)
MONOCYTES NFR BLD AUTO: 10.1 %
NEUTROPHILS # BLD AUTO: 2.84 X10(3)/MCL (ref 2.1–9.2)
NEUTROPHILS NFR BLD AUTO: 60.9 %
PLATELET # BLD AUTO: 186 X10(3)/MCL (ref 130–400)
PMV BLD AUTO: 9.9 FL (ref 7.4–10.4)
POTASSIUM SERPL-SCNC: 4.5 MMOL/L (ref 3.5–5.1)
PROT SERPL-MCNC: 7.8 GM/DL (ref 5.8–7.6)
RBC # BLD AUTO: 5.2 X10(6)/MCL (ref 4.7–6.1)
SODIUM SERPL-SCNC: 139 MMOL/L (ref 136–145)
WBC # SPEC AUTO: 4.7 X10(3)/MCL (ref 4.5–11.5)

## 2023-04-27 PROCEDURE — 85025 COMPLETE CBC W/AUTO DIFF WBC: CPT

## 2023-04-27 PROCEDURE — 1159F MED LIST DOCD IN RCRD: CPT | Mod: CPTII,,, | Performed by: STUDENT IN AN ORGANIZED HEALTH CARE EDUCATION/TRAINING PROGRAM

## 2023-04-27 PROCEDURE — 1160F RVW MEDS BY RX/DR IN RCRD: CPT | Mod: CPTII,,, | Performed by: STUDENT IN AN ORGANIZED HEALTH CARE EDUCATION/TRAINING PROGRAM

## 2023-04-27 PROCEDURE — 1160F PR REVIEW ALL MEDS BY PRESCRIBER/CLIN PHARMACIST DOCUMENTED: ICD-10-PCS | Mod: CPTII,,, | Performed by: STUDENT IN AN ORGANIZED HEALTH CARE EDUCATION/TRAINING PROGRAM

## 2023-04-27 PROCEDURE — 3008F PR BODY MASS INDEX (BMI) DOCUMENTED: ICD-10-PCS | Mod: CPTII,,, | Performed by: STUDENT IN AN ORGANIZED HEALTH CARE EDUCATION/TRAINING PROGRAM

## 2023-04-27 PROCEDURE — 3008F BODY MASS INDEX DOCD: CPT | Mod: CPTII,,, | Performed by: STUDENT IN AN ORGANIZED HEALTH CARE EDUCATION/TRAINING PROGRAM

## 2023-04-27 PROCEDURE — 99214 OFFICE O/P EST MOD 30 MIN: CPT | Mod: PBBFAC | Performed by: STUDENT IN AN ORGANIZED HEALTH CARE EDUCATION/TRAINING PROGRAM

## 2023-04-27 PROCEDURE — 1159F PR MEDICATION LIST DOCUMENTED IN MEDICAL RECORD: ICD-10-PCS | Mod: CPTII,,, | Performed by: STUDENT IN AN ORGANIZED HEALTH CARE EDUCATION/TRAINING PROGRAM

## 2023-04-27 PROCEDURE — 3079F DIAST BP 80-89 MM HG: CPT | Mod: CPTII,,, | Performed by: STUDENT IN AN ORGANIZED HEALTH CARE EDUCATION/TRAINING PROGRAM

## 2023-04-27 PROCEDURE — 3077F SYST BP >= 140 MM HG: CPT | Mod: CPTII,,, | Performed by: STUDENT IN AN ORGANIZED HEALTH CARE EDUCATION/TRAINING PROGRAM

## 2023-04-27 PROCEDURE — 36415 COLL VENOUS BLD VENIPUNCTURE: CPT

## 2023-04-27 PROCEDURE — 99999 PR PBB SHADOW E&M-EST. PATIENT-LVL IV: CPT | Mod: PBBFAC,,, | Performed by: STUDENT IN AN ORGANIZED HEALTH CARE EDUCATION/TRAINING PROGRAM

## 2023-04-27 PROCEDURE — 99213 OFFICE O/P EST LOW 20 MIN: CPT | Mod: S$PBB,,, | Performed by: STUDENT IN AN ORGANIZED HEALTH CARE EDUCATION/TRAINING PROGRAM

## 2023-04-27 PROCEDURE — 99999 PR PBB SHADOW E&M-EST. PATIENT-LVL IV: ICD-10-PCS | Mod: PBBFAC,,, | Performed by: STUDENT IN AN ORGANIZED HEALTH CARE EDUCATION/TRAINING PROGRAM

## 2023-04-27 PROCEDURE — 99213 PR OFFICE/OUTPT VISIT, EST, LEVL III, 20-29 MIN: ICD-10-PCS | Mod: S$PBB,,, | Performed by: STUDENT IN AN ORGANIZED HEALTH CARE EDUCATION/TRAINING PROGRAM

## 2023-04-27 PROCEDURE — 3079F PR MOST RECENT DIASTOLIC BLOOD PRESSURE 80-89 MM HG: ICD-10-PCS | Mod: CPTII,,, | Performed by: STUDENT IN AN ORGANIZED HEALTH CARE EDUCATION/TRAINING PROGRAM

## 2023-04-27 PROCEDURE — 80053 COMPREHEN METABOLIC PANEL: CPT

## 2023-04-27 PROCEDURE — 3077F PR MOST RECENT SYSTOLIC BLOOD PRESSURE >= 140 MM HG: ICD-10-PCS | Mod: CPTII,,, | Performed by: STUDENT IN AN ORGANIZED HEALTH CARE EDUCATION/TRAINING PROGRAM

## 2023-09-07 ENCOUNTER — LAB VISIT (OUTPATIENT)
Dept: LAB | Facility: HOSPITAL | Age: 63
End: 2023-09-07
Attending: STUDENT IN AN ORGANIZED HEALTH CARE EDUCATION/TRAINING PROGRAM
Payer: MEDICAID

## 2023-09-07 ENCOUNTER — OFFICE VISIT (OUTPATIENT)
Dept: HEMATOLOGY/ONCOLOGY | Facility: CLINIC | Age: 63
End: 2023-09-07
Payer: MEDICAID

## 2023-09-07 VITALS
BODY MASS INDEX: 26.07 KG/M2 | OXYGEN SATURATION: 100 % | HEART RATE: 52 BPM | DIASTOLIC BLOOD PRESSURE: 83 MMHG | HEIGHT: 69 IN | RESPIRATION RATE: 20 BRPM | WEIGHT: 176 LBS | SYSTOLIC BLOOD PRESSURE: 147 MMHG | TEMPERATURE: 98 F

## 2023-09-07 DIAGNOSIS — C01 CANCER OF BASE OF TONGUE: ICD-10-CM

## 2023-09-07 DIAGNOSIS — C76.0 HEAD AND NECK CANCER: Primary | ICD-10-CM

## 2023-09-07 LAB
ALBUMIN SERPL-MCNC: 4.3 G/DL (ref 3.4–4.8)
ALBUMIN/GLOB SERPL: 1.3 RATIO (ref 1.1–2)
ALP SERPL-CCNC: 99 UNIT/L (ref 40–150)
ALT SERPL-CCNC: 23 UNIT/L (ref 0–55)
AST SERPL-CCNC: 30 UNIT/L (ref 5–34)
BASOPHILS # BLD AUTO: 0.07 X10(3)/MCL
BASOPHILS NFR BLD AUTO: 1.5 %
BILIRUB SERPL-MCNC: 0.8 MG/DL
BUN SERPL-MCNC: 13 MG/DL (ref 8.4–25.7)
CALCIUM SERPL-MCNC: 9.6 MG/DL (ref 8.8–10)
CHLORIDE SERPL-SCNC: 106 MMOL/L (ref 98–107)
CO2 SERPL-SCNC: 25 MMOL/L (ref 23–31)
CREAT SERPL-MCNC: 1.6 MG/DL (ref 0.73–1.18)
EOSINOPHIL # BLD AUTO: 0.09 X10(3)/MCL (ref 0–0.9)
EOSINOPHIL NFR BLD AUTO: 1.9 %
ERYTHROCYTE [DISTWIDTH] IN BLOOD BY AUTOMATED COUNT: 13.7 % (ref 11.5–17)
GFR SERPLBLD CREATININE-BSD FMLA CKD-EPI: 48 MLS/MIN/1.73/M2
GLOBULIN SER-MCNC: 3.4 GM/DL (ref 2.4–3.5)
GLUCOSE SERPL-MCNC: 89 MG/DL (ref 82–115)
HCT VFR BLD AUTO: 46.6 % (ref 42–52)
HGB BLD-MCNC: 14.8 G/DL (ref 14–18)
IMM GRANULOCYTES # BLD AUTO: 0.02 X10(3)/MCL (ref 0–0.04)
IMM GRANULOCYTES NFR BLD AUTO: 0.4 %
LYMPHOCYTES # BLD AUTO: 0.81 X10(3)/MCL (ref 0.6–4.6)
LYMPHOCYTES NFR BLD AUTO: 16.8 %
MCH RBC QN AUTO: 29.5 PG (ref 27–31)
MCHC RBC AUTO-ENTMCNC: 31.8 G/DL (ref 33–36)
MCV RBC AUTO: 93 FL (ref 80–94)
MONOCYTES # BLD AUTO: 0.69 X10(3)/MCL (ref 0.1–1.3)
MONOCYTES NFR BLD AUTO: 14.3 %
NEUTROPHILS # BLD AUTO: 3.14 X10(3)/MCL (ref 2.1–9.2)
NEUTROPHILS NFR BLD AUTO: 65.1 %
PLATELET # BLD AUTO: 200 X10(3)/MCL (ref 130–400)
PMV BLD AUTO: 9.8 FL (ref 7.4–10.4)
POTASSIUM SERPL-SCNC: 4.4 MMOL/L (ref 3.5–5.1)
PROT SERPL-MCNC: 7.7 GM/DL (ref 5.8–7.6)
RBC # BLD AUTO: 5.01 X10(6)/MCL (ref 4.7–6.1)
SODIUM SERPL-SCNC: 140 MMOL/L (ref 136–145)
WBC # SPEC AUTO: 4.82 X10(3)/MCL (ref 4.5–11.5)

## 2023-09-07 PROCEDURE — 1160F RVW MEDS BY RX/DR IN RCRD: CPT | Mod: CPTII,,, | Performed by: STUDENT IN AN ORGANIZED HEALTH CARE EDUCATION/TRAINING PROGRAM

## 2023-09-07 PROCEDURE — 3079F PR MOST RECENT DIASTOLIC BLOOD PRESSURE 80-89 MM HG: ICD-10-PCS | Mod: CPTII,,, | Performed by: STUDENT IN AN ORGANIZED HEALTH CARE EDUCATION/TRAINING PROGRAM

## 2023-09-07 PROCEDURE — 1160F PR REVIEW ALL MEDS BY PRESCRIBER/CLIN PHARMACIST DOCUMENTED: ICD-10-PCS | Mod: CPTII,,, | Performed by: STUDENT IN AN ORGANIZED HEALTH CARE EDUCATION/TRAINING PROGRAM

## 2023-09-07 PROCEDURE — 99213 OFFICE O/P EST LOW 20 MIN: CPT | Mod: PBBFAC | Performed by: STUDENT IN AN ORGANIZED HEALTH CARE EDUCATION/TRAINING PROGRAM

## 2023-09-07 PROCEDURE — 99999 PR PBB SHADOW E&M-EST. PATIENT-LVL III: ICD-10-PCS | Mod: PBBFAC,,, | Performed by: STUDENT IN AN ORGANIZED HEALTH CARE EDUCATION/TRAINING PROGRAM

## 2023-09-07 PROCEDURE — 3077F SYST BP >= 140 MM HG: CPT | Mod: CPTII,,, | Performed by: STUDENT IN AN ORGANIZED HEALTH CARE EDUCATION/TRAINING PROGRAM

## 2023-09-07 PROCEDURE — 99213 OFFICE O/P EST LOW 20 MIN: CPT | Mod: S$PBB,,, | Performed by: STUDENT IN AN ORGANIZED HEALTH CARE EDUCATION/TRAINING PROGRAM

## 2023-09-07 PROCEDURE — 36415 COLL VENOUS BLD VENIPUNCTURE: CPT

## 2023-09-07 PROCEDURE — 3008F BODY MASS INDEX DOCD: CPT | Mod: CPTII,,, | Performed by: STUDENT IN AN ORGANIZED HEALTH CARE EDUCATION/TRAINING PROGRAM

## 2023-09-07 PROCEDURE — 1159F MED LIST DOCD IN RCRD: CPT | Mod: CPTII,,, | Performed by: STUDENT IN AN ORGANIZED HEALTH CARE EDUCATION/TRAINING PROGRAM

## 2023-09-07 PROCEDURE — 3008F PR BODY MASS INDEX (BMI) DOCUMENTED: ICD-10-PCS | Mod: CPTII,,, | Performed by: STUDENT IN AN ORGANIZED HEALTH CARE EDUCATION/TRAINING PROGRAM

## 2023-09-07 PROCEDURE — 1159F PR MEDICATION LIST DOCUMENTED IN MEDICAL RECORD: ICD-10-PCS | Mod: CPTII,,, | Performed by: STUDENT IN AN ORGANIZED HEALTH CARE EDUCATION/TRAINING PROGRAM

## 2023-09-07 PROCEDURE — 3079F DIAST BP 80-89 MM HG: CPT | Mod: CPTII,,, | Performed by: STUDENT IN AN ORGANIZED HEALTH CARE EDUCATION/TRAINING PROGRAM

## 2023-09-07 PROCEDURE — 85025 COMPLETE CBC W/AUTO DIFF WBC: CPT

## 2023-09-07 PROCEDURE — 99999 PR PBB SHADOW E&M-EST. PATIENT-LVL III: CPT | Mod: PBBFAC,,, | Performed by: STUDENT IN AN ORGANIZED HEALTH CARE EDUCATION/TRAINING PROGRAM

## 2023-09-07 PROCEDURE — 3077F PR MOST RECENT SYSTOLIC BLOOD PRESSURE >= 140 MM HG: ICD-10-PCS | Mod: CPTII,,, | Performed by: STUDENT IN AN ORGANIZED HEALTH CARE EDUCATION/TRAINING PROGRAM

## 2023-09-07 PROCEDURE — 80053 COMPREHEN METABOLIC PANEL: CPT

## 2023-09-07 PROCEDURE — 99213 PR OFFICE/OUTPT VISIT, EST, LEVL III, 20-29 MIN: ICD-10-PCS | Mod: S$PBB,,, | Performed by: STUDENT IN AN ORGANIZED HEALTH CARE EDUCATION/TRAINING PROGRAM

## 2023-09-07 NOTE — PROGRESS NOTES
"   [facility] Hematology/Oncology  PROGRESS NOTE      Subjective:         NAME: Esteban Martinez : 1960     62 y.o. male   MRN: [id number]    Referring Doc: No ref. provider found    Chief Complaint:    Chief Complaint   Patient presents with    Other [facility]     Pt reports no new concerns today.       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 Martinez is a 61 y.o. 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 Mr. Martinez presented with "lump in throat"    21  CT neck revealed  - bulky soft tissue in the region of the tongue base (3.5 x 3.1 cm) and palatine tonsillar fossa with partial occlusion of the left epiglottic valleculae and regional airway.    21 Dr. Navjot Gavin head and neck surgeon  in [place] , exam documents reveal "large exophytic mass filling the entire left side which appears to be emanating from the L glossotonsillar sulcus.  There is mass effect on the vallecula but no involvement of the vallecula.  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  Low-grade mediastinal lymph node activity suspect reactive    21 Started concurrent chemotherapy/radiation. Weekly cisplatin 40mg/m2. Delay in cycle # 4 on 10/12/2021(hospitalization - peg tube placement).     11/10/21 Completed weekly Cisplatin.      21 He was hospitalized  for dehydration.     21 Radiation completed.    1/10/22 He was seen in ER, for burning to the back of his tongue and throat attributed to " radiation. He underwent fiberoptic scope by Dr. Navjot Gavin in [place].    2/11/22 PET/CT  revealed no evidence of recurrence or metastatic diseases.  Tongue mass with no uptake but some uptake in neck. Planned to repeat in 3 months.     3/2/22 He was seen in the ER at [facility] with reports of abdominal pain, diagnosed with gastritis. CT Abdomen was normal.     5/2022 PET/CT  revealed intensity which appears to be physiologic activity of his tongue musculature. Resolving neck lymphadenopathy. PEG removed     7/2022 CT neck was negative for malignancy.     7/2023 CT neck was negative for malignancy.      Interval History:  Mr. Martinez presents today  with his wife for follow-up. He reports feeling well. He was seen by radiation oncologist Dr. Borden on 8/3/23 to go over CT Neck done on 7/27/23. Denies SOB, chest pain, trouble swallowing, fever, chills, no voice changes. No weight loss.     ROS:   GEN: normal without any fever, night sweats or weight loss  HEENT: normal with no HA's, sore throat, stiff neck, changes in vision  CV: normal with no CP, +SOB with exertion, PND, JARRETT or orthopnea  PULM: normal with no SOB, cough, hemoptysis, sputum or pleuritic pain  GI: normal with no abdominal pain, nausea, vomiting, constipation, diarrhea, melanotic stools, BRBPR, or hematemesis  : normal with no hematuria, dysuria  BREAST: normal with no mass, discharge, pain  SKIN: normal with no rash, erythema, bruising, or swelling    Allergies:  Review of patient's allergies indicates:   Allergen Reactions    Codeine Other (See Comments)     Seizures    Tramadol Nausea And Vomiting    Acetaminophen Other (See Comments)     Extra strength    Propoxyphene n-acetaminophen Nausea And Vomiting       Medications:    Current Outpatient Medications:     levothyroxine (SYNTHROID) 75 MCG tablet, Take 75 mcg by mouth every morning., Disp: , Rfl:   No current facility-administered medications for this visit.    Facility-Administered  "Medications Ordered in Other Visits:     lactated ringers infusion, , Intravenous, Continuous, Daniel Antoine DO, Last Rate: 10 mL/hr at 06/13/22 0738, New Bag at 06/13/22 0738    PMHx/PSHx Updates:   Past Medical History:   Diagnosis Date    Anxiety     Cancer of base of tongue     Hyperlipidemia     Hypertension     Hyperthyroidism     Throat cancer      Past Surgical History:   Procedure Laterality Date    CATARACT EXTRACTION      CHOLECYSTECTOMY      EXPLORATION OF EAR      GASTROSTOMY TUBE PLACEMENT      MEDIPORT REMOVAL Right 6/13/2022    Procedure: REMOVAL, CATHETER, CENTRAL VENOUS, TUNNELED, WITH PORT removal mediprt;  Surgeon: ZAINAB Edwards MD;  Location: [facility];  Service: General;  Laterality: Right;    PEG TUBE REMOVAL      SHOULDER SURGERY Left        Family History:  Family History   Problem Relation Age of Onset    Colon cancer Mother     Brain cancer Father        Social History:  Social History     Socioeconomic History    Marital status:    Tobacco Use    Smoking status: Never    Smokeless tobacco: Never   Substance and Sexual Activity    Alcohol use: Never    Drug use: Never    Sexual activity: Yes         Objective:     ECOG SCORE             Vitals:  Blood pressure (!) 147/83, pulse (!) 52, temperature 98.3 °F (36.8 °C), temperature source Oral, resp. rate 20, height 5' 9" (1.753 m), weight 79.8 kg (176 lb), SpO2 100 %.    Physical Examination:   GEN: no apparent distress, comfortable; AAOx3  HEAD: atraumatic and normocephalic  EYES: no pallor, no icterus, PERRLA  ENT: OMM, no pharyngeal erythema, external ears WNL; no nasal discharge; no thrush  NECK: no masses, thyroid normal, trachea midline, no LAD/LN's, supple  CV: RRR with no murmur; normal pulse; normal S1 and S2; no pedal edema  CHEST: Normal respiratory effort; CTAB; normal breath sounds; no wheeze or crackles  ABDOM: nontender and nondistended; soft; normal bowel sounds; no rebound/guarding  MUSC/Skeletal: ROM " normal; no crepitus; joints normal; no deformities or arthropathy  EXTREM: no clubbing, cyanosis, inflammation or swelling  SKIN: no rashes, lesions, ulcers, petechiae or subcutaneous nodules  : no valle  NEURO: grossly intact; motor/sensory WNL; AAOx3; no tremors  PSYCH: normal mood, affect and behavior  LYMPH: normal cervical, supraclavicular, axillary and groin LN's      Labs:   No visits with results within 1 Week(s) from this visit.   Latest known visit with results is:   Lab Visit on 04/27/2023   Component Date Value Ref Range Status    Sodium Level 04/27/2023 139  136 - 145 mmol/L Final    Potassium Level 04/27/2023 4.5  3.5 - 5.1 mmol/L Final    Chloride 04/27/2023 104  98 - 107 mmol/L Final    Carbon Dioxide 04/27/2023 24  23 - 31 mmol/L Final    Glucose Level 04/27/2023 95  82 - 115 mg/dL Final    Blood Urea Nitrogen 04/27/2023 30.0 (H)  8.4 - 25.7 mg/dL Final    Creatinine 04/27/2023 1.68 (H)  0.73 - 1.18 mg/dL Final    Calcium Level Total 04/27/2023 10.2 (H)  8.8 - 10.0 mg/dL Final    Protein Total 04/27/2023 7.8 (H)  5.8 - 7.6 gm/dL Final    Albumin Level 04/27/2023 4.5  3.4 - 4.8 g/dL Final    Globulin 04/27/2023 3.3  2.4 - 3.5 gm/dL Final    Albumin/Globulin Ratio 04/27/2023 1.4  1.1 - 2.0 ratio Final    Bilirubin Total 04/27/2023 0.7  <=1.5 mg/dL Final    Alkaline Phosphatase 04/27/2023 101  40 - 150 unit/L Final    Alanine Aminotransferase 04/27/2023 19  0 - 55 unit/L Final    Aspartate Aminotransferase 04/27/2023 24  5 - 34 unit/L Final    eGFR 04/27/2023 45  mls/min/1.73/m2 Final    WBC 04/27/2023 4.7  4.5 - 11.5 x10(3)/mcL Final    RBC 04/27/2023 5.20  4.70 - 6.10 x10(6)/mcL Final    Hgb 04/27/2023 15.5  14.0 - 18.0 g/dL Final    Hct 04/27/2023 47.0  42.0 - 52.0 % Final    MCV 04/27/2023 90.4  80.0 - 94.0 fL Final    MCH 04/27/2023 29.8  27.0 - 31.0 pg Final    MCHC 04/27/2023 33.0  33.0 - 36.0 g/dL Final    RDW 04/27/2023 13.2  11.5 - 17.0 % Final    Platelet 04/27/2023 186  130 - 400  "x10(3)/mcL Final    MPV 04/27/2023 9.9  7.4 - 10.4 fL Final    Neut % 04/27/2023 60.9  % Final    Lymph % 04/27/2023 17.4  % Final    Mono % 04/27/2023 10.1  % Final    Eos % 04/27/2023 9.9  % Final    Basophil % 04/27/2023 1.5  % Final    Lymph # 04/27/2023 0.81  0.6 - 4.6 x10(3)/mcL Final    Neut # 04/27/2023 2.84  2.1 - 9.2 x10(3)/mcL Final    Mono # 04/27/2023 0.47  0.1 - 1.3 x10(3)/mcL Final    Eos # 04/27/2023 0.46  0 - 0.9 x10(3)/mcL Final    Baso # 04/27/2023 0.07  0 - 0.2 x10(3)/mcL Final    IG# 04/27/2023 0.01  0 - 0.04 x10(3)/mcL Final    IG% 04/27/2023 0.2  % Final       Radiology/Diagnostic Studies:      I have reviewed all available lab results and radiology reports.    Assessment/Plan:     Impression and Plan   1. 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  - Dr. Navjot Gavin in [place] 8/25/21, exam documents "large exophytic mass filling the entire left side which appears to be emanating from the L glossotonsillar sulcus.  There is mass effect on the vallecula but no involvement of the vallecula.  There is also mass effect on the epiglottis but no involvement." He is now seeing Dr. Cho in [place] r/t Dr. Gavin leaving practice.    - 8/25/21 - L neck FNA - squamous cell carcinoma, p16 positive    - PET/CT 9/8/21 ([facility])-hypermetabolic tongue base mass offset to the left with associated left level 2 cervical lymph node metastasis.  Low-grade mediastinal lymph node activity suspect reactive     Weekly cisplatin 40mg/m2. Delay in cycle # 4 on 10/12/2021(hospitalization - peg tube placement).    Cisplatin completed 11/10/21.  Patient completed radiation therapy on 11/16/2021.    PET/CT on 2/11/22 revealed no evidence of recurrence or metastatic diseases.       --Repeat PET/CT in May 2022 revealed " intense what appears to be physiologic activity of his tongue musculature. Resolving neck lymphadenopathy.      CT Neck July 2022 revealed no evidence of recurrence or metastatic diseases.      Patient saw Surgeon Dr. Lyssa Cho 10/31/22 and fiberoptic scope was negative for recurrence, he will follow-up January 2023. He will follow-up with Dr. Borden March 2023 and they will determine next scan date per the patient.     CT Neck 7/27/23: No evidence of recurrent or metastatic disease    Per NCCN Guidelines:    H&P exam (including a complete head and neck exam; and mirror and fiberoptic examination):  Year 1, every 1-3 mo  Year 2, every 2-6 mo  Years 3-5, every 4-8 mo  >5 years, every 12 mo    Hypothyroidism:   Chronic renal insufficiency:     PLAN:  - RTC in 6 months with NP with labs  - cbc, cmp- 1 hr prior [facility]     Discussion:     The patient was seen, interviewed and examined. Pertinent lab and radiology studies were reviewed. Pt instructed to call should develop concerning signs/symptoms or have further questions.        Portions of the record may have been created with voice recognition software. Occasional wrong-word or sound-a-like substitutions may have occurred due to the inherent limitations of voice recognition software. Read the chart carefully and recognize, using context, where substitutions have occurred.     Elizabeth LeJeune, MD  Hematology/Oncology                  Answers submitted by the patient for this visit:  Review of Systems Questionnaire (Submitted on 9/7/2023)  appetite change : No  unexpected weight change: No  mouth sores: No  visual disturbance: No  cough: No  shortness of breath: No  chest pain: No  abdominal pain: No  diarrhea: No  frequency: No  back pain: No  rash: No  headaches: No  adenopathy: No  nervous/ anxious: No

## 2024-02-23 ENCOUNTER — LAB VISIT (OUTPATIENT)
Dept: LAB | Facility: HOSPITAL | Age: 64
End: 2024-02-23
Attending: STUDENT IN AN ORGANIZED HEALTH CARE EDUCATION/TRAINING PROGRAM
Payer: MEDICAID

## 2024-02-23 ENCOUNTER — OFFICE VISIT (OUTPATIENT)
Dept: HEMATOLOGY/ONCOLOGY | Facility: CLINIC | Age: 64
End: 2024-02-23
Payer: MEDICAID

## 2024-02-23 VITALS
OXYGEN SATURATION: 99 % | WEIGHT: 179.81 LBS | HEIGHT: 69 IN | SYSTOLIC BLOOD PRESSURE: 146 MMHG | HEART RATE: 58 BPM | DIASTOLIC BLOOD PRESSURE: 92 MMHG | BODY MASS INDEX: 26.63 KG/M2 | TEMPERATURE: 98 F | RESPIRATION RATE: 20 BRPM

## 2024-02-23 DIAGNOSIS — C01 CANCER OF BASE OF TONGUE: Primary | ICD-10-CM

## 2024-02-23 DIAGNOSIS — C76.0 HEAD AND NECK CANCER: ICD-10-CM

## 2024-02-23 LAB
ALBUMIN SERPL-MCNC: 4 G/DL (ref 3.4–4.8)
ALBUMIN/GLOB SERPL: 1.3 RATIO (ref 1.1–2)
ALP SERPL-CCNC: 78 UNIT/L (ref 40–150)
ALT SERPL-CCNC: 31 UNIT/L (ref 0–55)
AST SERPL-CCNC: 31 UNIT/L (ref 5–34)
BASOPHILS # BLD AUTO: 0.07 X10(3)/MCL
BASOPHILS NFR BLD AUTO: 1.3 %
BILIRUB SERPL-MCNC: 0.3 MG/DL
BUN SERPL-MCNC: 30 MG/DL (ref 8.4–25.7)
CALCIUM SERPL-MCNC: 9.8 MG/DL (ref 8.8–10)
CHLORIDE SERPL-SCNC: 107 MMOL/L (ref 98–107)
CO2 SERPL-SCNC: 25 MMOL/L (ref 23–31)
CREAT SERPL-MCNC: 1.84 MG/DL (ref 0.73–1.18)
EOSINOPHIL # BLD AUTO: 0.3 X10(3)/MCL (ref 0–0.9)
EOSINOPHIL NFR BLD AUTO: 5.5 %
ERYTHROCYTE [DISTWIDTH] IN BLOOD BY AUTOMATED COUNT: 13.6 % (ref 11.5–17)
GFR SERPLBLD CREATININE-BSD FMLA CKD-EPI: 40 MLS/MIN/1.73/M2
GLOBULIN SER-MCNC: 3.1 GM/DL (ref 2.4–3.5)
GLUCOSE SERPL-MCNC: 143 MG/DL (ref 82–115)
HCT VFR BLD AUTO: 45.2 % (ref 42–52)
HGB BLD-MCNC: 14.8 G/DL (ref 14–18)
IMM GRANULOCYTES # BLD AUTO: 0.04 X10(3)/MCL (ref 0–0.04)
IMM GRANULOCYTES NFR BLD AUTO: 0.7 %
LYMPHOCYTES # BLD AUTO: 0.85 X10(3)/MCL (ref 0.6–4.6)
LYMPHOCYTES NFR BLD AUTO: 15.5 %
MCH RBC QN AUTO: 30.1 PG (ref 27–31)
MCHC RBC AUTO-ENTMCNC: 32.7 G/DL (ref 33–36)
MCV RBC AUTO: 92.1 FL (ref 80–94)
MONOCYTES # BLD AUTO: 0.59 X10(3)/MCL (ref 0.1–1.3)
MONOCYTES NFR BLD AUTO: 10.7 %
NEUTROPHILS # BLD AUTO: 3.64 X10(3)/MCL (ref 2.1–9.2)
NEUTROPHILS NFR BLD AUTO: 66.3 %
PLATELET # BLD AUTO: 202 X10(3)/MCL (ref 130–400)
PMV BLD AUTO: 9.7 FL (ref 7.4–10.4)
POTASSIUM SERPL-SCNC: 4.2 MMOL/L (ref 3.5–5.1)
PROT SERPL-MCNC: 7.1 GM/DL (ref 5.8–7.6)
RBC # BLD AUTO: 4.91 X10(6)/MCL (ref 4.7–6.1)
SODIUM SERPL-SCNC: 140 MMOL/L (ref 136–145)
WBC # SPEC AUTO: 5.49 X10(3)/MCL (ref 4.5–11.5)

## 2024-02-23 PROCEDURE — 3288F FALL RISK ASSESSMENT DOCD: CPT | Mod: CPTII,,,

## 2024-02-23 PROCEDURE — 1160F RVW MEDS BY RX/DR IN RCRD: CPT | Mod: CPTII,,,

## 2024-02-23 PROCEDURE — 3077F SYST BP >= 140 MM HG: CPT | Mod: CPTII,,,

## 2024-02-23 PROCEDURE — 1101F PT FALLS ASSESS-DOCD LE1/YR: CPT | Mod: CPTII,,,

## 2024-02-23 PROCEDURE — 99213 OFFICE O/P EST LOW 20 MIN: CPT | Mod: S$PBB,,,

## 2024-02-23 PROCEDURE — 3008F BODY MASS INDEX DOCD: CPT | Mod: CPTII,,,

## 2024-02-23 PROCEDURE — 85025 COMPLETE CBC W/AUTO DIFF WBC: CPT

## 2024-02-23 PROCEDURE — 3080F DIAST BP >= 90 MM HG: CPT | Mod: CPTII,,,

## 2024-02-23 PROCEDURE — 99214 OFFICE O/P EST MOD 30 MIN: CPT | Mod: PBBFAC

## 2024-02-23 PROCEDURE — 1159F MED LIST DOCD IN RCRD: CPT | Mod: CPTII,,,

## 2024-02-23 PROCEDURE — 80053 COMPREHEN METABOLIC PANEL: CPT

## 2024-02-23 PROCEDURE — 99999 PR PBB SHADOW E&M-EST. PATIENT-LVL IV: CPT | Mod: PBBFAC,,,

## 2024-02-23 PROCEDURE — 36415 COLL VENOUS BLD VENIPUNCTURE: CPT

## 2024-02-23 PROCEDURE — 1126F AMNT PAIN NOTED NONE PRSNT: CPT | Mod: CPTII,,,

## 2024-02-23 RX ORDER — LORAZEPAM 0.5 MG/1
0.5 TABLET ORAL 2 TIMES DAILY PRN
COMMUNITY
Start: 2024-02-19

## 2024-02-23 NOTE — PROGRESS NOTES
"   [facility] Hematology/Oncology  PROGRESS NOTE      Subjective:         NAME: Esteban Martinez : 1960     63 y.o. male   MRN: [id number]    Referring Doc: No ref. provider found    Chief Complaint:    Chief Complaint   Patient presents with    Head and Neck cancer     No complaints.       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 Martinez is a 61 y.o. 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 Mr. Martinez presented with "lump in throat"    21  CT neck revealed  - bulky soft tissue in the region of the tongue base (3.5 x 3.1 cm) and palatine tonsillar fossa with partial occlusion of the left epiglottic valleculae and regional airway.    21 Dr. Navjot Gavin head and neck surgeon  in [place] , exam documents reveal "large exophytic mass filling the entire left side which appears to be emanating from the L glossotonsillar sulcus.  There is mass effect on the vallecula but no involvement of the vallecula.  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  Low-grade mediastinal lymph node activity suspect reactive    21 Started concurrent chemotherapy/radiation. Weekly cisplatin 40mg/m2. Delay in cycle # 4 on 10/12/2021(hospitalization - peg tube placement).     11/10/21 Completed weekly Cisplatin.      21 He was hospitalized  for dehydration.     21 Radiation completed.    1/10/22 He was seen in ER, for burning to the back of his tongue and throat attributed to radiation. He " underwent fiberoptic scope by Dr. Navjot Gavin in [place].    2/11/22 PET/CT  revealed no evidence of recurrence or metastatic diseases.  Tongue mass with no uptake but some uptake in neck. Planned to repeat in 3 months.     3/2/22 He was seen in the ER at [facility] with reports of abdominal pain, diagnosed with gastritis. CT Abdomen was normal.     5/2022 PET/CT  revealed intensity which appears to be physiologic activity of his tongue musculature. Resolving neck lymphadenopathy. PEG removed     7/2022 CT neck was negative for malignancy.     7/2023 CT neck was negative for malignancy.      Interval History:  Mr. Martinez presents today with his wife for six month follow-up. He reports feeling well. He was seen by radiation oncologist Dr. Borden January 2024, with a follow up on 6/18/2024.  Denies SOB, chest pain, trouble swallowing, fever, chills, no voice changes. No weight loss.       Review of Systems   Constitutional:  Negative for activity change, appetite change, fatigue, fever and unexpected weight change.   HENT:  Negative for sore throat and trouble swallowing.    Respiratory:  Negative for shortness of breath.    Cardiovascular:  Negative for chest pain.   Gastrointestinal:  Negative for abdominal pain, blood in stool, change in bowel habit, constipation, diarrhea, nausea and vomiting.   Genitourinary:  Negative for hematuria.   Musculoskeletal:  Negative for arthralgias and back pain.   Allergic/Immunologic: Negative for frequent infections.   Neurological:  Negative for dizziness and coordination difficulties.   Hematological:  Negative for adenopathy. Does not bruise/bleed easily.   Psychiatric/Behavioral:  The patient is not nervous/anxious.        Allergies:  Review of patient's allergies indicates:   Allergen Reactions    Codeine Other (See Comments)     Seizures    Tramadol Nausea And Vomiting    Acetaminophen Other (See Comments)     Extra strength    Propoxyphene n-acetaminophen Nausea And  "Vomiting       Medications:    Current Outpatient Medications:     levothyroxine (SYNTHROID) 75 MCG tablet, Take 75 mcg by mouth every morning., Disp: , Rfl:     LORazepam (ATIVAN) 0.5 MG tablet, Take 0.5 mg by mouth 2 (two) times daily as needed., Disp: , Rfl:   No current facility-administered medications for this visit.    Facility-Administered Medications Ordered in Other Visits:     lactated ringers infusion, , Intravenous, Continuous, Daniel Antoine DO, Last Rate: 10 mL/hr at 06/13/22 0738, New Bag at 06/13/22 0738    PMHx/PSHx Updates:   Past Medical History:   Diagnosis Date    Anxiety     Cancer of base of tongue     Hyperlipidemia     Hypertension     Hyperthyroidism     Throat cancer      Past Surgical History:   Procedure Laterality Date    CATARACT EXTRACTION      CHOLECYSTECTOMY      EXPLORATION OF EAR      GASTROSTOMY TUBE PLACEMENT      MEDIPORT REMOVAL Right 6/13/2022    Procedure: REMOVAL, CATHETER, CENTRAL VENOUS, TUNNELED, WITH PORT removal mediprt;  Surgeon: ZAINAB Edwards MD;  Location: [facility];  Service: General;  Laterality: Right;    PEG TUBE REMOVAL      SHOULDER SURGERY Left        Family History:  Family History   Problem Relation Age of Onset    Colon cancer Mother     Brain cancer Father        Social History:  Social History     Socioeconomic History    Marital status:    Tobacco Use    Smoking status: Never    Smokeless tobacco: Never   Substance and Sexual Activity    Alcohol use: Never    Drug use: Never    Sexual activity: Yes         Objective:     ECOG SCORE             Vitals:  Blood pressure (!) 146/92, pulse (!) 58, temperature 97.7 °F (36.5 °C), resp. rate 20, height 5' 9.02" (1.753 m), weight 81.6 kg (179 lb 12.8 oz), SpO2 99 %.    Physical Exam  Vitals and nursing note reviewed.   HENT:      Head: Normocephalic and atraumatic.      Mouth/Throat:      Mouth: Mucous membranes are moist.      Pharynx: Oropharynx is clear.   Eyes:      Extraocular Movements: " Extraocular movements intact.      Conjunctiva/sclera: Conjunctivae normal.      Pupils: Pupils are equal, round, and reactive to light.   Cardiovascular:      Rate and Rhythm: Normal rate and regular rhythm.   Pulmonary:      Effort: Pulmonary effort is normal.      Breath sounds: Normal breath sounds.   Abdominal:      General: Abdomen is flat. Bowel sounds are normal.      Palpations: Abdomen is soft.   Musculoskeletal:         General: Normal range of motion.      Cervical back: Normal range of motion and neck supple.   Lymphadenopathy:      Cervical: No cervical adenopathy.      Upper Body:      Right upper body: No supraclavicular or axillary adenopathy.      Left upper body: No supraclavicular or axillary adenopathy.   Skin:     General: Skin is warm and dry.   Neurological:      General: No focal deficit present.      Mental Status: He is alert.   Psychiatric:         Mood and Affect: Mood normal.              Labs:   Lab Visit on 02/23/2024   Component Date Value Ref Range Status    Sodium Level 02/23/2024 140  136 - 145 mmol/L Final    Potassium Level 02/23/2024 4.2  3.5 - 5.1 mmol/L Final    Chloride 02/23/2024 107  98 - 107 mmol/L Final    Carbon Dioxide 02/23/2024 25  23 - 31 mmol/L Final    Glucose Level 02/23/2024 143 (H)  82 - 115 mg/dL Final    Blood Urea Nitrogen 02/23/2024 30.0 (H)  8.4 - 25.7 mg/dL Final    Creatinine 02/23/2024 1.84 (H)  0.73 - 1.18 mg/dL Final    Calcium Level Total 02/23/2024 9.8  8.8 - 10.0 mg/dL Final    Protein Total 02/23/2024 7.1  5.8 - 7.6 gm/dL Final    Albumin Level 02/23/2024 4.0  3.4 - 4.8 g/dL Final    Globulin 02/23/2024 3.1  2.4 - 3.5 gm/dL Final    Albumin/Globulin Ratio 02/23/2024 1.3  1.1 - 2.0 ratio Final    Bilirubin Total 02/23/2024 0.3  <=1.5 mg/dL Final    Alkaline Phosphatase 02/23/2024 78  40 - 150 unit/L Final    Alanine Aminotransferase 02/23/2024 31  0 - 55 unit/L Final    Aspartate Aminotransferase 02/23/2024 31  5 - 34 unit/L Final    eGFR  "02/23/2024 40  mls/min/1.73/m2 Final    WBC 02/23/2024 5.49  4.50 - 11.50 x10(3)/mcL Final    RBC 02/23/2024 4.91  4.70 - 6.10 x10(6)/mcL Final    Hgb 02/23/2024 14.8  14.0 - 18.0 g/dL Final    Hct 02/23/2024 45.2  42.0 - 52.0 % Final    MCV 02/23/2024 92.1  80.0 - 94.0 fL Final    MCH 02/23/2024 30.1  27.0 - 31.0 pg Final    MCHC 02/23/2024 32.7 (L)  33.0 - 36.0 g/dL Final    RDW 02/23/2024 13.6  11.5 - 17.0 % Final    Platelet 02/23/2024 202  130 - 400 x10(3)/mcL Final    MPV 02/23/2024 9.7  7.4 - 10.4 fL Final    Neut % 02/23/2024 66.3  % Final    Lymph % 02/23/2024 15.5  % Final    Mono % 02/23/2024 10.7  % Final    Eos % 02/23/2024 5.5  % Final    Basophil % 02/23/2024 1.3  % Final    Lymph # 02/23/2024 0.85  0.6 - 4.6 x10(3)/mcL Final    Neut # 02/23/2024 3.64  2.1 - 9.2 x10(3)/mcL Final    Mono # 02/23/2024 0.59  0.1 - 1.3 x10(3)/mcL Final    Eos # 02/23/2024 0.30  0 - 0.9 x10(3)/mcL Final    Baso # 02/23/2024 0.07  <=0.2 x10(3)/mcL Final    IG# 02/23/2024 0.04  0 - 0.04 x10(3)/mcL Final    IG% 02/23/2024 0.7  % Final       Radiology/Diagnostic Studies:      I have reviewed all available lab results and radiology reports.    Assessment/Plan:     Impression and Plan   1. 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  - Dr. Navjot Gavin in [place] 8/25/21, exam documents "large exophytic mass filling the entire left side which appears to be emanating from the L glossotonsillar sulcus.  There is mass effect on the vallecula but no involvement of the vallecula.  There is also mass effect on the epiglottis but no involvement." He is now seeing Dr. Cho in [place] r/t Dr. Gavin leaving practice.    - 8/25/21 - L neck FNA - squamous cell carcinoma, p16 positive    - PET/CT 9/8/21 ([facility])-hypermetabolic tongue base mass " offset to the left with associated left level 2 cervical lymph node metastasis.  Low-grade mediastinal lymph node activity suspect reactive     Weekly cisplatin 40mg/m2. Delay in cycle # 4 on 10/12/2021(hospitalization - peg tube placement).    Cisplatin completed 11/10/21.  Patient completed radiation therapy on 11/16/2021.    PET/CT on 2/11/22 revealed no evidence of recurrence or metastatic diseases.       --Repeat PET/CT in May 2022 revealed intense what appears to be physiologic activity of his tongue musculature. Resolving neck lymphadenopathy.      CT Neck July 2022 revealed no evidence of recurrence or metastatic diseases.      Patient saw Surgeon Dr. Lyssa Cho 10/31/22 and fiberoptic scope was negative for recurrence, he will follow-up January 2023. He will follow-up with Dr. Borden March 2023 and they will determine next scan date per the patient.     CT Neck 7/27/23: No evidence of recurrent or metastatic disease    Per NCCN Guidelines:    H&P exam (including a complete head and neck exam; and mirror and fiberoptic examination):  Year 1, every 1-3 mo  Year 2, every 2-6 mo  Years 3-5, every 4-8 mo  >5 years, every 12 mo    Hypothyroidism:   Chronic renal insufficiency:     PLAN:    - RTC in 6 months with NP with labs  - cbc, cmp- 1 hr prior @ [facility]     Discussion:     The patient was seen, interviewed and examined. Pertinent lab and radiology studies were reviewed. Pt instructed to call should develop concerning signs/symptoms or have further questions.        Deidre Vidal, ELIDAP-C  Oncology/Hematology  Cancer Center [facility]

## 2024-08-23 ENCOUNTER — OFFICE VISIT (OUTPATIENT)
Dept: HEMATOLOGY/ONCOLOGY | Facility: CLINIC | Age: 64
End: 2024-08-23
Payer: MEDICARE

## 2024-08-23 ENCOUNTER — LAB VISIT (OUTPATIENT)
Dept: LAB | Facility: HOSPITAL | Age: 64
End: 2024-08-23
Payer: MEDICARE

## 2024-08-23 VITALS
OXYGEN SATURATION: 100 % | HEIGHT: 69 IN | DIASTOLIC BLOOD PRESSURE: 88 MMHG | BODY MASS INDEX: 26.85 KG/M2 | TEMPERATURE: 98 F | RESPIRATION RATE: 20 BRPM | SYSTOLIC BLOOD PRESSURE: 152 MMHG | WEIGHT: 181.31 LBS | HEART RATE: 53 BPM

## 2024-08-23 DIAGNOSIS — C01 CANCER OF BASE OF TONGUE: Primary | ICD-10-CM

## 2024-08-23 DIAGNOSIS — C01 CANCER OF BASE OF TONGUE: ICD-10-CM

## 2024-08-23 LAB
ALBUMIN SERPL-MCNC: 4.2 G/DL (ref 3.4–4.8)
ALBUMIN/GLOB SERPL: 1.3 RATIO (ref 1.1–2)
ALP SERPL-CCNC: 73 UNIT/L (ref 40–150)
ALT SERPL-CCNC: 25 UNIT/L (ref 0–55)
ANION GAP SERPL CALC-SCNC: 9 MEQ/L
AST SERPL-CCNC: 21 UNIT/L (ref 5–34)
BASOPHILS # BLD AUTO: 0.09 X10(3)/MCL
BASOPHILS NFR BLD AUTO: 1.6 %
BILIRUB SERPL-MCNC: 0.9 MG/DL
BUN SERPL-MCNC: 24 MG/DL (ref 8.4–25.7)
CALCIUM SERPL-MCNC: 10 MG/DL (ref 8.8–10)
CHLORIDE SERPL-SCNC: 107 MMOL/L (ref 98–107)
CO2 SERPL-SCNC: 27 MMOL/L (ref 23–31)
CREAT SERPL-MCNC: 1.89 MG/DL (ref 0.73–1.18)
CREAT/UREA NIT SERPL: 13
EOSINOPHIL # BLD AUTO: 0.17 X10(3)/MCL (ref 0–0.9)
EOSINOPHIL NFR BLD AUTO: 3.1 %
ERYTHROCYTE [DISTWIDTH] IN BLOOD BY AUTOMATED COUNT: 14.1 % (ref 11.5–17)
GFR SERPLBLD CREATININE-BSD FMLA CKD-EPI: 39 ML/MIN/1.73/M2
GLOBULIN SER-MCNC: 3.2 GM/DL (ref 2.4–3.5)
GLUCOSE SERPL-MCNC: 101 MG/DL (ref 82–115)
HCT VFR BLD AUTO: 46.4 % (ref 42–52)
HGB BLD-MCNC: 15.5 G/DL (ref 14–18)
IMM GRANULOCYTES # BLD AUTO: 0.02 X10(3)/MCL (ref 0–0.04)
IMM GRANULOCYTES NFR BLD AUTO: 0.4 %
LYMPHOCYTES # BLD AUTO: 1.08 X10(3)/MCL (ref 0.6–4.6)
LYMPHOCYTES NFR BLD AUTO: 19.4 %
MCH RBC QN AUTO: 30 PG (ref 27–31)
MCHC RBC AUTO-ENTMCNC: 33.4 G/DL (ref 33–36)
MCV RBC AUTO: 89.7 FL (ref 80–94)
MONOCYTES # BLD AUTO: 0.63 X10(3)/MCL (ref 0.1–1.3)
MONOCYTES NFR BLD AUTO: 11.3 %
NEUTROPHILS # BLD AUTO: 3.57 X10(3)/MCL (ref 2.1–9.2)
NEUTROPHILS NFR BLD AUTO: 64.2 %
PLATELET # BLD AUTO: 197 X10(3)/MCL (ref 130–400)
PMV BLD AUTO: 9.6 FL (ref 7.4–10.4)
POTASSIUM SERPL-SCNC: 4.5 MMOL/L (ref 3.5–5.1)
PROT SERPL-MCNC: 7.4 GM/DL (ref 5.8–7.6)
RBC # BLD AUTO: 5.17 X10(6)/MCL (ref 4.7–6.1)
SODIUM SERPL-SCNC: 143 MMOL/L (ref 136–145)
WBC # BLD AUTO: 5.56 X10(3)/MCL (ref 4.5–11.5)

## 2024-08-23 PROCEDURE — 80053 COMPREHEN METABOLIC PANEL: CPT

## 2024-08-23 PROCEDURE — 85025 COMPLETE CBC W/AUTO DIFF WBC: CPT

## 2024-08-23 PROCEDURE — 99999 PR PBB SHADOW E&M-EST. PATIENT-LVL IV: CPT | Mod: PBBFAC,,,

## 2024-08-23 PROCEDURE — 36415 COLL VENOUS BLD VENIPUNCTURE: CPT

## 2024-08-23 RX ORDER — LEVOTHYROXINE SODIUM 100 UG/1
100 TABLET ORAL
COMMUNITY
Start: 2024-04-08

## 2024-08-23 NOTE — PROGRESS NOTES
"   [facility] Hematology/Oncology  PROGRESS NOTE      Subjective:         NAME: Esteban Martinez : 1960     63 y.o. male   MRN: [id number]    Referring Doc: No ref. provider found    Chief Complaint:    Chief Complaint   Patient presents with    Head and neck cancer     No complaints.        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 Martinez is a 61 y.o. 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 Mr. Martinez presented with "lump in throat"    21  CT neck revealed  - bulky soft tissue in the region of the tongue base (3.5 x 3.1 cm) and palatine tonsillar fossa with partial occlusion of the left epiglottic valleculae and regional airway.    21 Dr. Navjot Gavin head and neck surgeon  in [place] , exam documents reveal "large exophytic mass filling the entire left side which appears to be emanating from the L glossotonsillar sulcus.  There is mass effect on the vallecula but no involvement of the vallecula.  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  Low-grade mediastinal lymph node activity suspect reactive    21 Started concurrent chemotherapy/radiation. Weekly cisplatin 40mg/m2. Delay in cycle # 4 on 10/12/2021(hospitalization - peg tube placement).     11/10/21 Completed weekly Cisplatin.      21 He was hospitalized  for dehydration.     21 Radiation completed.    1/10/22 He was seen in ER, for burning to the back of his tongue and throat attributed to radiation. He " underwent fiberoptic scope by Dr. Navjot Gavin in [place].    2/11/22 PET/CT  revealed no evidence of recurrence or metastatic diseases.  Tongue mass with no uptake but some uptake in neck. Planned to repeat in 3 months.     3/2/22 He was seen in the ER at [facility] with reports of abdominal pain, diagnosed with gastritis. CT Abdomen was normal.     5/2022 PET/CT  revealed intensity which appears to be physiologic activity of his tongue musculature. Resolving neck lymphadenopathy. PEG removed     7/2022 CT neck was negative for malignancy.     7/2023 CT neck was negative for malignancy.      Interval History:  Mr. Martinez presents today with his wife for six month follow-up. He reports feeling well. He was seen by radiation oncologist Dr. Borden on 6/18/2024, with a follow up in six months. Denies SOB, chest pain, trouble swallowing, fever, chills, no voice changes. No weight loss.       Review of Systems   Constitutional:  Negative for activity change, appetite change, fatigue, fever and unexpected weight change.   HENT:  Negative for mouth sores, sore throat and trouble swallowing.    Eyes:  Negative for visual disturbance.   Respiratory:  Negative for cough and shortness of breath.    Cardiovascular:  Negative for chest pain.   Gastrointestinal:  Negative for abdominal pain, blood in stool, change in bowel habit, constipation, diarrhea, nausea and vomiting.   Genitourinary:  Negative for frequency and hematuria.   Musculoskeletal:  Negative for arthralgias and back pain.   Integumentary:  Negative for rash.   Allergic/Immunologic: Negative for frequent infections.   Neurological:  Negative for dizziness, headaches and coordination difficulties.   Hematological:  Negative for adenopathy. Does not bruise/bleed easily.   Psychiatric/Behavioral:  The patient is not nervous/anxious.        Allergies:  Review of patient's allergies indicates:   Allergen Reactions    Codeine Other (See Comments)     Seizures    Tramadol  "Nausea And Vomiting    Acetaminophen Other (See Comments)     Extra strength    Propoxyphene n-acetaminophen Nausea And Vomiting       Medications:    Current Outpatient Medications:     levothyroxine (SYNTHROID) 100 MCG tablet, 100 mcg., Disp: , Rfl:     LORazepam (ATIVAN) 0.5 MG tablet, Take 0.5 mg by mouth 2 (two) times daily as needed., Disp: , Rfl:   No current facility-administered medications for this visit.    Facility-Administered Medications Ordered in Other Visits:     lactated ringers infusion, , Intravenous, Continuous, Daniel Antoine DO, Last Rate: 10 mL/hr at 06/13/22 0738, New Bag at 06/13/22 0738    PMHx/PSHx Updates:   Past Medical History:   Diagnosis Date    Anxiety     Cancer of base of tongue     Hyperlipidemia     Hypertension     Hyperthyroidism     Throat cancer      Past Surgical History:   Procedure Laterality Date    CATARACT EXTRACTION      CHOLECYSTECTOMY      EXPLORATION OF EAR      GASTROSTOMY TUBE PLACEMENT      MEDIPORT REMOVAL Right 6/13/2022    Procedure: REMOVAL, CATHETER, CENTRAL VENOUS, TUNNELED, WITH PORT removal mediprt;  Surgeon: ZAINAB Edwards MD;  Location: [facility];  Service: General;  Laterality: Right;    PEG TUBE REMOVAL      SHOULDER SURGERY Left        Family History:  Family History   Problem Relation Name Age of Onset    Colon cancer Mother      Brain cancer Father         Social History:  Social History     Socioeconomic History    Marital status:    Tobacco Use    Smoking status: Never    Smokeless tobacco: Never   Substance and Sexual Activity    Alcohol use: Never    Drug use: Never    Sexual activity: Yes         Objective:     ECOG SCORE             Vitals:  Blood pressure (!) 152/88, pulse (!) 53, temperature 97.9 °F (36.6 °C), resp. rate 20, height 5' 9.02" (1.753 m), weight 82.2 kg (181 lb 4.8 oz), SpO2 100%.    Physical Exam  Vitals and nursing note reviewed.   HENT:      Head: Normocephalic and atraumatic.      Mouth/Throat:      Mouth: " Mucous membranes are moist.      Pharynx: Oropharynx is clear.   Eyes:      Extraocular Movements: Extraocular movements intact.      Conjunctiva/sclera: Conjunctivae normal.      Pupils: Pupils are equal, round, and reactive to light.   Cardiovascular:      Rate and Rhythm: Normal rate and regular rhythm.   Pulmonary:      Effort: Pulmonary effort is normal.      Breath sounds: Normal breath sounds.   Abdominal:      General: Abdomen is flat. Bowel sounds are normal.      Palpations: Abdomen is soft.   Musculoskeletal:         General: Normal range of motion.      Cervical back: Normal range of motion and neck supple.   Lymphadenopathy:      Cervical: No cervical adenopathy.      Upper Body:      Right upper body: No supraclavicular or axillary adenopathy.      Left upper body: No supraclavicular or axillary adenopathy.   Skin:     General: Skin is warm and dry.   Neurological:      General: No focal deficit present.      Mental Status: He is alert.   Psychiatric:         Mood and Affect: Mood normal.              Labs:   Lab Visit on 08/23/2024   Component Date Value Ref Range Status    Sodium 08/23/2024 143  136 - 145 mmol/L Final    Potassium 08/23/2024 4.5  3.5 - 5.1 mmol/L Final    Chloride 08/23/2024 107  98 - 107 mmol/L Final    CO2 08/23/2024 27  23 - 31 mmol/L Final    Glucose 08/23/2024 101  82 - 115 mg/dL Final    Blood Urea Nitrogen 08/23/2024 24.0  8.4 - 25.7 mg/dL Final    Creatinine 08/23/2024 1.89 (H)  0.73 - 1.18 mg/dL Final    Calcium 08/23/2024 10.0  8.8 - 10.0 mg/dL Final    Protein Total 08/23/2024 7.4  5.8 - 7.6 gm/dL Final    Albumin 08/23/2024 4.2  3.4 - 4.8 g/dL Final    Globulin 08/23/2024 3.2  2.4 - 3.5 gm/dL Final    Albumin/Globulin Ratio 08/23/2024 1.3  1.1 - 2.0 ratio Final    Bilirubin Total 08/23/2024 0.9  <=1.5 mg/dL Final    ALP 08/23/2024 73  40 - 150 unit/L Final    ALT 08/23/2024 25  0 - 55 unit/L Final    AST 08/23/2024 21  5 - 34 unit/L Final    eGFR 08/23/2024 39   "mL/min/1.73/m2 Final    Anion Gap 08/23/2024 9.0  mEq/L Final    BUN/Creatinine Ratio 08/23/2024 13   Final    WBC 08/23/2024 5.56  4.50 - 11.50 x10(3)/mcL Final    RBC 08/23/2024 5.17  4.70 - 6.10 x10(6)/mcL Final    Hgb 08/23/2024 15.5  14.0 - 18.0 g/dL Final    Hct 08/23/2024 46.4  42.0 - 52.0 % Final    MCV 08/23/2024 89.7  80.0 - 94.0 fL Final    MCH 08/23/2024 30.0  27.0 - 31.0 pg Final    MCHC 08/23/2024 33.4  33.0 - 36.0 g/dL Final    RDW 08/23/2024 14.1  11.5 - 17.0 % Final    Platelet 08/23/2024 197  130 - 400 x10(3)/mcL Final    MPV 08/23/2024 9.6  7.4 - 10.4 fL Final    Neut % 08/23/2024 64.2  % Final    Lymph % 08/23/2024 19.4  % Final    Mono % 08/23/2024 11.3  % Final    Eos % 08/23/2024 3.1  % Final    Basophil % 08/23/2024 1.6  % Final    Lymph # 08/23/2024 1.08  0.6 - 4.6 x10(3)/mcL Final    Neut # 08/23/2024 3.57  2.1 - 9.2 x10(3)/mcL Final    Mono # 08/23/2024 0.63  0.1 - 1.3 x10(3)/mcL Final    Eos # 08/23/2024 0.17  0 - 0.9 x10(3)/mcL Final    Baso # 08/23/2024 0.09  <=0.2 x10(3)/mcL Final    IG# 08/23/2024 0.02  0 - 0.04 x10(3)/mcL Final    IG% 08/23/2024 0.4  % Final       Radiology/Diagnostic Studies:      I have reviewed all available lab results and radiology reports.    Assessment/Plan:     Impression and Plan   1. 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  - Dr. Navjot Gavin in [place] 8/25/21, exam documents "large exophytic mass filling the entire left side which appears to be emanating from the L glossotonsillar sulcus.  There is mass effect on the vallecula but no involvement of the vallecula.  There is also mass effect on the epiglottis but no involvement." He is now seeing Dr. Cho in [place] r/t Dr. Gavin leaving practice.    - 8/25/21 - L neck FNA - squamous cell carcinoma, p16 " positive    - PET/CT 9/8/21 ([facility])-hypermetabolic tongue base mass offset to the left with associated left level 2 cervical lymph node metastasis.  Low-grade mediastinal lymph node activity suspect reactive     Weekly cisplatin 40mg/m2. Delay in cycle # 4 on 10/12/2021(hospitalization - peg tube placement).    Cisplatin completed 11/10/21.  Patient completed radiation therapy on 11/16/2021.    PET/CT on 2/11/22 revealed no evidence of recurrence or metastatic diseases.       --Repeat PET/CT in May 2022 revealed intense what appears to be physiologic activity of his tongue musculature. Resolving neck lymphadenopathy.      CT Neck July 2022 revealed no evidence of recurrence or metastatic diseases.      Patient saw Surgeon Dr. Lyssa Cho 10/31/22 and fiberoptic scope was negative for recurrence, he will follow-up January 2023. He will follow-up with Dr. Borden March 2023 and they will determine next scan date per the patient.     CT Neck 7/27/23: No evidence of recurrent or metastatic disease    Per NCCN Guidelines:    H&P exam (including a complete head and neck exam; and mirror and fiberoptic examination):  Year 1, every 1-3 mo  Year 2, every 2-6 mo  Years 3-5, every 4-8 mo  >5 years, every 12 mo    Hypothyroidism:   Chronic renal insufficiency:     PLAN:    -Continue follow up with Dr. Borden, 12/2024  - RTC in 6 months with MD with labs  - cbc, cmp- 1 hr prior @ [facility]     Discussion:     The patient was seen, interviewed and examined. Pertinent lab and radiology studies were reviewed. Pt instructed to call should develop concerning signs/symptoms or have further questions.        Deidre Vidal, ELIDAP-C  Oncology/Hematology  Cancer Center [facility]

## 2025-02-14 NOTE — PROGRESS NOTES
"   [facility] Hematology/Oncology  PROGRESS NOTE      Subjective:         NAME: Esteban Martinez : 1960     64 y.o. male   MRN: [id number]    Referring Doc: No ref. 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 Martinez is a 61 y.o. 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 Mr. Martinez presented with "lump in throat"    21  CT neck revealed  - bulky soft tissue in the region of the tongue base (3.5 x 3.1 cm) and palatine tonsillar fossa with partial occlusion of the left epiglottic valleculae and regional airway.    21 Dr. Navjot Gavin head and neck surgeon  in [place] , exam documents reveal "large exophytic mass filling the entire left side which appears to be emanating from the L glossotonsillar sulcus.  There is mass effect on the vallecula but no involvement of the vallecula.  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  Low-grade mediastinal lymph node activity suspect reactive    21 Started concurrent chemotherapy/radiation. Weekly cisplatin 40mg/m2. Delay in cycle # 4 on 10/12/2021(hospitalization - peg tube placement).     11/10/21 Completed weekly Cisplatin.      21 He was hospitalized  for dehydration.     21 Radiation completed.    1/10/22 He was seen in ER, for burning to the back of his tongue and throat attributed to radiation. He underwent fiberoptic scope by Dr. Navjot Gavin in [place].    22 " PET/CT  revealed no evidence of recurrence or metastatic diseases.  Tongue mass with no uptake but some uptake in neck. Planned to repeat in 3 months.     3/2/22 He was seen in the ER at [facility] with reports of abdominal pain, diagnosed with gastritis. CT Abdomen was normal.     5/2022 PET/CT  revealed intensity which appears to be physiologic activity of his tongue musculature. Resolving neck lymphadenopathy. PEG removed     7/2022 CT neck was negative for malignancy.     7/2023 CT neck was negative for malignancy.      Interval History:  Mr. Martinez presents today with his wife for six month follow-up.   Overall patient is doing well.  He was seen by radiation oncologist Dr. Borden in December 2024.  Denies SOB, chest pain, trouble swallowing, fever, chills, no voice changes. No weight loss.       Review of Systems   Constitutional:  Negative for activity change, appetite change, fatigue, fever and unexpected weight change.   HENT:  Negative for mouth sores, sore throat and trouble swallowing.    Eyes:  Negative for visual disturbance.   Respiratory:  Negative for cough and shortness of breath.    Cardiovascular:  Negative for chest pain.   Gastrointestinal:  Negative for abdominal pain, blood in stool, change in bowel habit, constipation, diarrhea, nausea and vomiting.   Genitourinary:  Negative for frequency and hematuria.   Musculoskeletal:  Negative for arthralgias and back pain.   Integumentary:  Negative for rash.   Allergic/Immunologic: Negative for frequent infections.   Neurological:  Negative for dizziness, headaches and coordination difficulties.   Hematological:  Negative for adenopathy. Does not bruise/bleed easily.   Psychiatric/Behavioral:  The patient is not nervous/anxious.        Allergies:  Review of patient's allergies indicates:   Allergen Reactions    Codeine Other (See Comments)     Seizures    Tramadol Nausea And Vomiting    Acetaminophen Other (See Comments)     Extra strength     Propoxyphene n-acetaminophen Nausea And Vomiting       Medications:    Current Outpatient Medications:     levothyroxine (SYNTHROID) 100 MCG tablet, 100 mcg., Disp: , Rfl:     LORazepam (ATIVAN) 0.5 MG tablet, Take 0.5 mg by mouth 2 (two) times daily as needed., Disp: , Rfl:   No current facility-administered medications for this visit.    Facility-Administered Medications Ordered in Other Visits:     lactated ringers infusion, , Intravenous, Continuous, Daniel Antoine DO, Last Rate: 10 mL/hr at 06/13/22 0738, New Bag at 06/13/22 0738    PMHx/PSHx Updates:   Past Medical History:   Diagnosis Date    Anxiety     Cancer of base of tongue     Hyperlipidemia     Hypertension     Hyperthyroidism     Throat cancer      Past Surgical History:   Procedure Laterality Date    CATARACT EXTRACTION      CHOLECYSTECTOMY      EXPLORATION OF EAR      GASTROSTOMY TUBE PLACEMENT      MEDIPORT REMOVAL Right 6/13/2022    Procedure: REMOVAL, CATHETER, CENTRAL VENOUS, TUNNELED, WITH PORT removal mediprt;  Surgeon: ZAINAB Edwards MD;  Location: [facility];  Service: General;  Laterality: Right;    PEG TUBE REMOVAL      SHOULDER SURGERY Left        Family History:  Family History   Problem Relation Name Age of Onset    Colon cancer Mother      Brain cancer Father         Social History:  Social History     Socioeconomic History    Marital status:    Tobacco Use    Smoking status: Never    Smokeless tobacco: Never   Substance and Sexual Activity    Alcohol use: Never    Drug use: Never    Sexual activity: Yes         Objective:     ECOG SCORE             Vitals:  There were no vitals taken for this visit.    Physical Exam  Vitals and nursing note reviewed.   HENT:      Head: Normocephalic and atraumatic.      Mouth/Throat:      Mouth: Mucous membranes are moist.      Pharynx: Oropharynx is clear.   Eyes:      Extraocular Movements: Extraocular movements intact.      Conjunctiva/sclera: Conjunctivae  normal.      Pupils: Pupils are equal, round, and reactive to light.   Cardiovascular:      Rate and Rhythm: Normal rate and regular rhythm.   Pulmonary:      Effort: Pulmonary effort is normal.      Breath sounds: Normal breath sounds.   Abdominal:      General: Abdomen is flat. Bowel sounds are normal.      Palpations: Abdomen is soft.   Musculoskeletal:         General: Normal range of motion.      Cervical back: Normal range of motion and neck supple.   Lymphadenopathy:      Cervical: No cervical adenopathy.      Upper Body:      Right upper body: No supraclavicular or axillary adenopathy.      Left upper body: No supraclavicular or axillary adenopathy.   Skin:     General: Skin is warm and dry.   Neurological:      General: No focal deficit present.      Mental Status: He is alert.   Psychiatric:         Mood and Affect: Mood normal.            Labs:   No visits with results within 1 Week(s) from this visit.   Latest known visit with results is:   Lab Visit on 08/23/2024   Component Date Value Ref Range Status    Sodium 08/23/2024 143  136 - 145 mmol/L Final    Potassium 08/23/2024 4.5  3.5 - 5.1 mmol/L Final    Chloride 08/23/2024 107  98 - 107 mmol/L Final    CO2 08/23/2024 27  23 - 31 mmol/L Final    Glucose 08/23/2024 101  82 - 115 mg/dL Final    Blood Urea Nitrogen 08/23/2024 24.0  8.4 - 25.7 mg/dL Final    Creatinine 08/23/2024 1.89 (H)  0.73 - 1.18 mg/dL Final    Calcium 08/23/2024 10.0  8.8 - 10.0 mg/dL Final    Protein Total 08/23/2024 7.4  5.8 - 7.6 gm/dL Final    Albumin 08/23/2024 4.2  3.4 - 4.8 g/dL Final    Globulin 08/23/2024 3.2  2.4 - 3.5 gm/dL Final    Albumin/Globulin Ratio 08/23/2024 1.3  1.1 - 2.0 ratio Final    Bilirubin Total 08/23/2024 0.9  <=1.5 mg/dL Final    ALP 08/23/2024 73  40 - 150 unit/L Final    ALT 08/23/2024 25  0 - 55 unit/L Final    AST 08/23/2024 21  5 - 34 unit/L Final    eGFR 08/23/2024 39  mL/min/1.73/m2 Final    Anion Gap 08/23/2024 9.0  mEq/L Final  "   BUN/Creatinine Ratio 08/23/2024 13   Final    WBC 08/23/2024 5.56  4.50 - 11.50 x10(3)/mcL Final    RBC 08/23/2024 5.17  4.70 - 6.10 x10(6)/mcL Final    Hgb 08/23/2024 15.5  14.0 - 18.0 g/dL Final    Hct 08/23/2024 46.4  42.0 - 52.0 % Final    MCV 08/23/2024 89.7  80.0 - 94.0 fL Final    MCH 08/23/2024 30.0  27.0 - 31.0 pg Final    MCHC 08/23/2024 33.4  33.0 - 36.0 g/dL Final    RDW 08/23/2024 14.1  11.5 - 17.0 % Final    Platelet 08/23/2024 197  130 - 400 x10(3)/mcL Final    MPV 08/23/2024 9.6  7.4 - 10.4 fL Final    Neut % 08/23/2024 64.2  % Final    Lymph % 08/23/2024 19.4  % Final    Mono % 08/23/2024 11.3  % Final    Eos % 08/23/2024 3.1  % Final    Basophil % 08/23/2024 1.6  % Final    Lymph # 08/23/2024 1.08  0.6 - 4.6 x10(3)/mcL Final    Neut # 08/23/2024 3.57  2.1 - 9.2 x10(3)/mcL Final    Mono # 08/23/2024 0.63  0.1 - 1.3 x10(3)/mcL Final    Eos # 08/23/2024 0.17  0 - 0.9 x10(3)/mcL Final    Baso # 08/23/2024 0.09  <=0.2 x10(3)/mcL Final    Imm Gran # 08/23/2024 0.02  0 - 0.04 x10(3)/mcL Final    Imm Grans % 08/23/2024 0.4  % Final       Radiology/Diagnostic Studies:      I have reviewed all available lab results and radiology reports.    Assessment/Plan:     Impression and Plan   1. 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  - Dr. Navjot Gavin in [place] 8/25/21, exam documents "large exophytic mass filling the entire left side which appears to be emanating from the L glossotonsillar sulcus.  There is mass effect on the vallecula but no involvement of the vallecula.  There is also mass effect on the epiglottis but no involvement." He is now seeing Dr. Cho in [place] r/t Dr. Gavin leaving practice.    - 8/25/21 - L neck FNA - squamous cell carcinoma, p16 positive    - PET/CT " 9/8/21 ([facility])-hypermetabolic tongue base mass offset to the left with associated left level 2 cervical lymph node metastasis.  Low-grade mediastinal lymph node activity suspect reactive     Weekly cisplatin 40mg/m2. Delay in cycle # 4 on 10/12/2021(hospitalization - peg tube placement).    Cisplatin completed 11/10/21.  Patient completed radiation therapy on 11/16/2021.    PET/CT on 2/11/22 revealed no evidence of recurrence or metastatic diseases.       --Repeat PET/CT in May 2022 revealed intense what appears to be physiologic activity of his tongue musculature. Resolving neck lymphadenopathy.      CT Neck July 2022 revealed no evidence of recurrence or metastatic diseases.      Patient saw Surgeon Dr. Lyssa Cho 10/31/22 and fiberoptic scope was negative for recurrence, he will follow-up January 2023. He will follow-up with Dr. Borden March 2023 and they will determine next scan date per the patient.     CT Neck 7/27/23: No evidence of recurrent or metastatic disease    Per NCCN Guidelines:    H&P exam (including a complete head and neck exam; and mirror and fiberoptic examination):  Year 1, every 1-3 mo  Year 2, every 2-6 mo  Years 3-5, every 4-8 mo  >5 years, every 12 mo    Hypothyroidism:   Chronic renal insufficiency:       Plan:     The patient was seen, interviewed and examined. Pertinent lab and radiology studies were reviewed. Pt instructed to call should develop concerning signs/symptoms or have further questions.     -Continue follow up with Dr. Borden, 12/2024  - RTC in 6 months with MD with labs  - cbc, cmp- 1 hr prior @ [facility]      Elizabeth Lejeune MD  Hematology /Oncology  Cancer Center [facility]     Professional Services:  Karley HUTCHISON LPN, acted solely as a scribe for and in the presence of Dr. Elizabeth Lejeune, who performed these services

## 2025-02-19 NOTE — PROGRESS NOTES
"   [facility] Hematology/Oncology  PROGRESS NOTE      Subjective:         NAME: Esteban Martinez : 1960     64 y.o. male   MRN: [id number]    Referring Doc: No ref. provider found    Chief Complaint:    Chief Complaint   Patient presents with    Cancer of base of tongue     No complaints.        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 Martinez is a 61 y.o. 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 Mr. Martinez presented with "lump in throat"    21  CT neck revealed  - bulky soft tissue in the region of the tongue base (3.5 x 3.1 cm) and palatine tonsillar fossa with partial occlusion of the left epiglottic valleculae and regional airway.    21 Dr. Navjot Gavin head and neck surgeon  in [place] , exam documents reveal "large exophytic mass filling the entire left side which appears to be emanating from the L glossotonsillar sulcus.  There is mass effect on the vallecula but no involvement of the vallecula.  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  Low-grade mediastinal lymph node activity suspect reactive    21 Started concurrent chemotherapy/radiation. Weekly cisplatin 40mg/m2. Delay in cycle # 4 on 10/12/2021(hospitalization - peg tube placement).     11/10/21 Completed weekly Cisplatin.      21 He was hospitalized  for dehydration.     21 Radiation completed.    1/10/22 He was seen in ER, for burning to the back of his tongue and throat attributed to radiation. " He underwent fiberoptic scope by Dr. Navjot Gavin in [place].    2/11/22 PET/CT  revealed no evidence of recurrence or metastatic diseases.  Tongue mass with no uptake but some uptake in neck. Planned to repeat in 3 months.     3/2/22 He was seen in the ER at [facility] with reports of abdominal pain, diagnosed with gastritis. CT Abdomen was normal.     5/2022 PET/CT  revealed intensity which appears to be physiologic activity of his tongue musculature. Resolving neck lymphadenopathy. PEG removed     7/2022 CT neck was negative for malignancy.     7/2023 CT neck was negative for malignancy.      Interval History:  Mr. Martinez presents today alone for six month follow-up. He reports feeling well, with no concerns or complaints. He continues to be followed by rad/onc q6m (last seen 12/2024) and ENT q6m (last seen 9/2024). Denies SOB, chest pain, trouble swallowing, fever, chills, no voice changes. No weight loss.       Review of Systems   Constitutional:  Negative for activity change, appetite change, fatigue, fever and unexpected weight change.   HENT:  Negative for mouth sores, sore throat and trouble swallowing.    Eyes:  Negative for visual disturbance.   Respiratory:  Negative for cough and shortness of breath.    Cardiovascular:  Negative for chest pain.   Gastrointestinal:  Negative for abdominal pain, blood in stool, change in bowel habit, constipation, diarrhea, nausea and vomiting.   Genitourinary:  Negative for frequency and hematuria.   Musculoskeletal:  Negative for arthralgias and back pain.   Integumentary:  Negative for rash.   Allergic/Immunologic: Negative for frequent infections.   Neurological:  Negative for dizziness, headaches and coordination difficulties.   Hematological:  Negative for adenopathy. Does not bruise/bleed easily.   Psychiatric/Behavioral:  The patient is not nervous/anxious.        Allergies:  Review of patient's allergies indicates:   Allergen Reactions    Codeine Other (See  "Comments)     Seizures    Tramadol Nausea And Vomiting    Acetaminophen Other (See Comments)     Extra strength    Propoxyphene n-acetaminophen Nausea And Vomiting       Medications:    Current Outpatient Medications:     levothyroxine (SYNTHROID) 100 MCG tablet, 100 mcg., Disp: , Rfl:     LORazepam (ATIVAN) 0.5 MG tablet, Take 0.5 mg by mouth 2 (two) times daily as needed., Disp: , Rfl:   No current facility-administered medications for this visit.    Facility-Administered Medications Ordered in Other Visits:     lactated ringers infusion, , Intravenous, Continuous, Daniel Antoine DO, Last Rate: 10 mL/hr at 06/13/22 0738, New Bag at 06/13/22 0738    PMHx/PSHx Updates:   Past Medical History:   Diagnosis Date    Anxiety     Cancer of base of tongue     Hyperlipidemia     Hypertension     Hyperthyroidism     Throat cancer      Past Surgical History:   Procedure Laterality Date    CATARACT EXTRACTION      CHOLECYSTECTOMY      EXPLORATION OF EAR      GASTROSTOMY TUBE PLACEMENT      MEDIPORT REMOVAL Right 6/13/2022    Procedure: REMOVAL, CATHETER, CENTRAL VENOUS, TUNNELED, WITH PORT removal mediprt;  Surgeon: ZAINAB Edwards MD;  Location: [facility];  Service: General;  Laterality: Right;    PEG TUBE REMOVAL      SHOULDER SURGERY Left        Family History:  Family History   Problem Relation Name Age of Onset    Colon cancer Mother      Brain cancer Father         Social History:  Social History     Socioeconomic History    Marital status:    Tobacco Use    Smoking status: Never    Smokeless tobacco: Never   Substance and Sexual Activity    Alcohol use: Never    Drug use: Never    Sexual activity: Yes         Objective:     ECOG SCORE    0 - Fully active-able to carry on all pre-disease performance without restriction         Vitals:  Blood pressure (!) 152/95, pulse 60, temperature 98 °F (36.7 °C), temperature source Oral, resp. rate 16, height 5' 9.02" (1.753 m), weight 84.3 kg (185 lb 14.4 oz), SpO2 " 99%.    Physical Exam  Vitals and nursing note reviewed.   HENT:      Head: Normocephalic and atraumatic.      Mouth/Throat:      Mouth: Mucous membranes are moist.      Pharynx: Oropharynx is clear.   Eyes:      Extraocular Movements: Extraocular movements intact.      Conjunctiva/sclera: Conjunctivae normal.      Pupils: Pupils are equal, round, and reactive to light.   Cardiovascular:      Rate and Rhythm: Normal rate and regular rhythm.   Pulmonary:      Effort: Pulmonary effort is normal.      Breath sounds: Normal breath sounds.   Abdominal:      General: Abdomen is flat. Bowel sounds are normal.      Palpations: Abdomen is soft.   Musculoskeletal:         General: Normal range of motion.      Cervical back: Normal range of motion and neck supple.   Lymphadenopathy:      Cervical: No cervical adenopathy.      Upper Body:      Right upper body: No supraclavicular or axillary adenopathy.      Left upper body: No supraclavicular or axillary adenopathy.   Skin:     General: Skin is warm and dry.   Neurological:      General: No focal deficit present.      Mental Status: He is alert.   Psychiatric:         Mood and Affect: Mood normal.              Labs:   Lab Visit on 02/20/2025   Component Date Value Ref Range Status    Sodium 02/20/2025 139  136 - 145 mmol/L Final    Potassium 02/20/2025 4.1  3.5 - 5.1 mmol/L Final    Chloride 02/20/2025 107  98 - 107 mmol/L Final    CO2 02/20/2025 21 (L)  23 - 31 mmol/L Final    Glucose 02/20/2025 80 (L)  82 - 115 mg/dL Final    Blood Urea Nitrogen 02/20/2025 15.0  8.4 - 25.7 mg/dL Final    Creatinine 02/20/2025 1.63 (H)  0.72 - 1.25 mg/dL Final    Calcium 02/20/2025 9.2  8.8 - 10.0 mg/dL Final    Protein Total 02/20/2025 7.8 (H)  5.8 - 7.6 gm/dL Final    Albumin 02/20/2025 4.4  3.4 - 4.8 g/dL Final    Globulin 02/20/2025 3.4  2.4 - 3.5 gm/dL Final    Albumin/Globulin Ratio 02/20/2025 1.3  1.1 - 2.0 ratio Final    Bilirubin Total 02/20/2025 0.9  <=1.5 mg/dL Final    ALP  "02/20/2025 77  40 - 150 unit/L Final    ALT 02/20/2025 28  0 - 55 unit/L Final    AST 02/20/2025 25  5 - 34 unit/L Final    eGFR 02/20/2025 46  mL/min/1.73/m2 Final    Estimated GFR calculated using the CKD-EPI creatinine (2021) equation.    Anion Gap 02/20/2025 11.0  mEq/L Final    BUN/Creatinine Ratio 02/20/2025 9   Final    WBC 02/20/2025 5.24  4.50 - 11.50 x10(3)/mcL Final    RBC 02/20/2025 5.34  4.70 - 6.10 x10(6)/mcL Final    Hgb 02/20/2025 16.0  14.0 - 18.0 g/dL Final    Hct 02/20/2025 48.6  42.0 - 52.0 % Final    MCV 02/20/2025 91.0  80.0 - 94.0 fL Final    MCH 02/20/2025 30.0  27.0 - 31.0 pg Final    MCHC 02/20/2025 32.9 (L)  33.0 - 36.0 g/dL Final    RDW 02/20/2025 13.6  11.5 - 17.0 % Final    Platelet 02/20/2025 215  130 - 400 x10(3)/mcL Final    MPV 02/20/2025 10.6 (H)  7.4 - 10.4 fL Final    Neut % 02/20/2025 63.9  % Final    Lymph % 02/20/2025 21.0  % Final    Mono % 02/20/2025 10.5  % Final    Eos % 02/20/2025 2.5  % Final    Basophil % 02/20/2025 1.5  % Final    Imm Grans % 02/20/2025 0.6  % Final    Neut # 02/20/2025 3.35  2.1 - 9.2 x10(3)/mcL Final    Lymph # 02/20/2025 1.10  0.6 - 4.6 x10(3)/mcL Final    Mono # 02/20/2025 0.55  0.1 - 1.3 x10(3)/mcL Final    Eos # 02/20/2025 0.13  0 - 0.9 x10(3)/mcL Final    Baso # 02/20/2025 0.08  <=0.2 x10(3)/mcL Final    Imm Gran # 02/20/2025 0.03  0.00 - 0.04 x10(3)/mcL Final    NRBC% 02/20/2025 0.0  % Final       Radiology/Diagnostic Studies:      I have reviewed all available lab results and radiology reports.    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  - Dr. Navjot Gavin in [place] 8/25/21, exam documents "large exophytic mass filling the entire left side which appears to be emanating from the L glossotonsillar sulcus.  There is mass " "effect on the vallecula but no involvement of the vallecula.  There is also mass effect on the epiglottis but no involvement." He is now seeing Dr. Cho in [place] r/t Dr. Gavin leaving practice.  - 8/25/21 - L neck FNA - squamous cell carcinoma, p16 positive  - PET/CT 9/8/21 ([facility])-hypermetabolic tongue base mass offset to the left with associated left level 2 cervical lymph node metastasis.  Low-grade mediastinal lymph node activity suspect reactive  Weekly cisplatin 40mg/m2. Delay in cycle # 4 on 10/12/2021(hospitalization - peg tube placement).    Cisplatin completed 11/10/21.  Patient completed radiation therapy on 11/16/2021.    PET/CT on 2/11/22 revealed no evidence of recurrence or metastatic diseases.    --Repeat PET/CT in May 2022 revealed intense what appears to be physiologic activity of his tongue musculature. Resolving neck lymphadenopathy.  CT Neck July 2022 revealed no evidence of recurrence or metastatic diseases.   CT Neck 7/27/23: No evidence of recurrent or metastatic disease    Per NCCN Guidelines:   H&P exam (including a complete head and neck exam; and mirror and fiberoptic examination):  Year 1, every 1-3 mo  Year 2, every 2-6 mo  Years 3-5, every 4-8 mo  >5 years, every 12 mo    Hypothyroidism: managed by ENT  Chronic renal insufficiency: Stable    PLAN:  Labs and exam stable  Continue with follow-ups with ENT, 3/2025  Continue follow up with Dr. Borden, 6/2025  RTC in 6 months with NP for FU/lab   - cbc, cmp- 1 hr prior @ [facility]     Discussion:     The patient was seen, interviewed and examined. Pertinent lab and radiology studies were reviewed. Pt instructed to call should develop concerning signs/symptoms or have further questions.        Kristine Mathews, FNP-C  Oncology/Hematology   Cancer Center [facility]           "

## 2025-02-20 ENCOUNTER — LAB VISIT (OUTPATIENT)
Dept: LAB | Facility: HOSPITAL | Age: 65
End: 2025-02-20
Payer: MEDICARE

## 2025-02-20 ENCOUNTER — OFFICE VISIT (OUTPATIENT)
Dept: HEMATOLOGY/ONCOLOGY | Facility: CLINIC | Age: 65
End: 2025-02-20
Payer: MEDICARE

## 2025-02-20 VITALS
TEMPERATURE: 98 F | BODY MASS INDEX: 27.53 KG/M2 | SYSTOLIC BLOOD PRESSURE: 152 MMHG | DIASTOLIC BLOOD PRESSURE: 95 MMHG | RESPIRATION RATE: 16 BRPM | WEIGHT: 185.88 LBS | HEIGHT: 69 IN | OXYGEN SATURATION: 99 % | HEART RATE: 60 BPM

## 2025-02-20 DIAGNOSIS — C01 CANCER OF BASE OF TONGUE: Primary | ICD-10-CM

## 2025-02-20 DIAGNOSIS — C01 CANCER OF BASE OF TONGUE: ICD-10-CM

## 2025-02-20 DIAGNOSIS — C76.0 HEAD AND NECK CANCER: ICD-10-CM

## 2025-02-20 PROBLEM — C77.0 METASTASIS TO CERVICAL LYMPH NODE: Status: RESOLVED | Noted: 2025-02-20 | Resolved: 2025-02-20

## 2025-02-20 PROBLEM — C77.0 METASTASIS TO CERVICAL LYMPH NODE: Status: ACTIVE | Noted: 2025-02-20

## 2025-02-20 LAB
ALBUMIN SERPL-MCNC: 4.4 G/DL (ref 3.4–4.8)
ALBUMIN/GLOB SERPL: 1.3 RATIO (ref 1.1–2)
ALP SERPL-CCNC: 77 UNIT/L (ref 40–150)
ALT SERPL-CCNC: 28 UNIT/L (ref 0–55)
ANION GAP SERPL CALC-SCNC: 11 MEQ/L
AST SERPL-CCNC: 25 UNIT/L (ref 5–34)
BASOPHILS # BLD AUTO: 0.08 X10(3)/MCL
BASOPHILS NFR BLD AUTO: 1.5 %
BILIRUB SERPL-MCNC: 0.9 MG/DL
BUN SERPL-MCNC: 15 MG/DL (ref 8.4–25.7)
CALCIUM SERPL-MCNC: 9.2 MG/DL (ref 8.8–10)
CHLORIDE SERPL-SCNC: 107 MMOL/L (ref 98–107)
CO2 SERPL-SCNC: 21 MMOL/L (ref 23–31)
CREAT SERPL-MCNC: 1.63 MG/DL (ref 0.72–1.25)
CREAT/UREA NIT SERPL: 9
EOSINOPHIL # BLD AUTO: 0.13 X10(3)/MCL (ref 0–0.9)
EOSINOPHIL NFR BLD AUTO: 2.5 %
ERYTHROCYTE [DISTWIDTH] IN BLOOD BY AUTOMATED COUNT: 13.6 % (ref 11.5–17)
GFR SERPLBLD CREATININE-BSD FMLA CKD-EPI: 46 ML/MIN/1.73/M2
GLOBULIN SER-MCNC: 3.4 GM/DL (ref 2.4–3.5)
GLUCOSE SERPL-MCNC: 80 MG/DL (ref 82–115)
HCT VFR BLD AUTO: 48.6 % (ref 42–52)
HGB BLD-MCNC: 16 G/DL (ref 14–18)
IMM GRANULOCYTES # BLD AUTO: 0.03 X10(3)/MCL (ref 0–0.04)
IMM GRANULOCYTES NFR BLD AUTO: 0.6 %
LYMPHOCYTES # BLD AUTO: 1.1 X10(3)/MCL (ref 0.6–4.6)
LYMPHOCYTES NFR BLD AUTO: 21 %
MCH RBC QN AUTO: 30 PG (ref 27–31)
MCHC RBC AUTO-ENTMCNC: 32.9 G/DL (ref 33–36)
MCV RBC AUTO: 91 FL (ref 80–94)
MONOCYTES # BLD AUTO: 0.55 X10(3)/MCL (ref 0.1–1.3)
MONOCYTES NFR BLD AUTO: 10.5 %
NEUTROPHILS # BLD AUTO: 3.35 X10(3)/MCL (ref 2.1–9.2)
NEUTROPHILS NFR BLD AUTO: 63.9 %
NRBC BLD AUTO-RTO: 0 %
PLATELET # BLD AUTO: 215 X10(3)/MCL (ref 130–400)
PMV BLD AUTO: 10.6 FL (ref 7.4–10.4)
POTASSIUM SERPL-SCNC: 4.1 MMOL/L (ref 3.5–5.1)
PROT SERPL-MCNC: 7.8 GM/DL (ref 5.8–7.6)
RBC # BLD AUTO: 5.34 X10(6)/MCL (ref 4.7–6.1)
SODIUM SERPL-SCNC: 139 MMOL/L (ref 136–145)
WBC # BLD AUTO: 5.24 X10(3)/MCL (ref 4.5–11.5)

## 2025-02-20 PROCEDURE — 85025 COMPLETE CBC W/AUTO DIFF WBC: CPT

## 2025-02-20 PROCEDURE — 36415 COLL VENOUS BLD VENIPUNCTURE: CPT

## 2025-02-20 PROCEDURE — 80053 COMPREHEN METABOLIC PANEL: CPT

## 2025-08-22 ENCOUNTER — LAB VISIT (OUTPATIENT)
Dept: LAB | Facility: HOSPITAL | Age: 65
End: 2025-08-22
Payer: MEDICARE

## 2025-08-22 ENCOUNTER — OFFICE VISIT (OUTPATIENT)
Facility: CLINIC | Age: 65
End: 2025-08-22
Payer: MEDICARE

## 2025-08-22 VITALS
HEART RATE: 60 BPM | BODY MASS INDEX: 26.96 KG/M2 | RESPIRATION RATE: 18 BRPM | TEMPERATURE: 98 F | DIASTOLIC BLOOD PRESSURE: 95 MMHG | HEIGHT: 69 IN | WEIGHT: 182 LBS | OXYGEN SATURATION: 99 % | SYSTOLIC BLOOD PRESSURE: 160 MMHG

## 2025-08-22 DIAGNOSIS — C01 CANCER OF BASE OF TONGUE: ICD-10-CM

## 2025-08-22 DIAGNOSIS — C76.0 HEAD AND NECK CANCER: ICD-10-CM

## 2025-08-22 LAB
ALBUMIN SERPL-MCNC: 4 G/DL (ref 3.4–4.8)
ALBUMIN/GLOB SERPL: 1.1 RATIO (ref 1.1–2)
ALP SERPL-CCNC: 73 UNIT/L (ref 40–150)
ALT SERPL-CCNC: 41 UNIT/L (ref 0–55)
ANION GAP SERPL CALC-SCNC: 9 MEQ/L
AST SERPL-CCNC: 25 UNIT/L (ref 11–45)
BASOPHILS # BLD AUTO: 0.08 X10(3)/MCL
BASOPHILS NFR BLD AUTO: 1.1 %
BILIRUB SERPL-MCNC: 0.8 MG/DL
BUN SERPL-MCNC: 27 MG/DL (ref 8.4–25.7)
CALCIUM SERPL-MCNC: 9.6 MG/DL (ref 8.8–10)
CHLORIDE SERPL-SCNC: 104 MMOL/L (ref 98–107)
CO2 SERPL-SCNC: 28 MMOL/L (ref 23–31)
CREAT SERPL-MCNC: 1.65 MG/DL (ref 0.72–1.25)
CREAT/UREA NIT SERPL: 16
EOSINOPHIL # BLD AUTO: 0.26 X10(3)/MCL (ref 0–0.9)
EOSINOPHIL NFR BLD AUTO: 3.7 %
ERYTHROCYTE [DISTWIDTH] IN BLOOD BY AUTOMATED COUNT: 14.1 % (ref 11.5–17)
GFR SERPLBLD CREATININE-BSD FMLA CKD-EPI: 46 ML/MIN/1.73/M2
GLOBULIN SER-MCNC: 3.5 GM/DL (ref 2.4–3.5)
GLUCOSE SERPL-MCNC: 87 MG/DL (ref 82–115)
HCT VFR BLD AUTO: 49.6 % (ref 42–52)
HGB BLD-MCNC: 16.2 G/DL (ref 14–18)
IMM GRANULOCYTES # BLD AUTO: 0.18 X10(3)/MCL (ref 0–0.04)
IMM GRANULOCYTES NFR BLD AUTO: 2.6 %
LYMPHOCYTES # BLD AUTO: 1.11 X10(3)/MCL (ref 0.6–4.6)
LYMPHOCYTES NFR BLD AUTO: 15.8 %
MAGNESIUM SERPL-MCNC: 1.9 MG/DL (ref 1.6–2.6)
MCH RBC QN AUTO: 30.2 PG (ref 27–31)
MCHC RBC AUTO-ENTMCNC: 32.7 G/DL (ref 33–36)
MCV RBC AUTO: 92.5 FL (ref 80–94)
MONOCYTES # BLD AUTO: 0.69 X10(3)/MCL (ref 0.1–1.3)
MONOCYTES NFR BLD AUTO: 9.8 %
NEUTROPHILS # BLD AUTO: 4.71 X10(3)/MCL (ref 2.1–9.2)
NEUTROPHILS NFR BLD AUTO: 67 %
NRBC BLD AUTO-RTO: 0 %
PLATELET # BLD AUTO: 215 X10(3)/MCL (ref 130–400)
PMV BLD AUTO: 9.9 FL (ref 7.4–10.4)
POTASSIUM SERPL-SCNC: 4.1 MMOL/L (ref 3.5–5.1)
PROT SERPL-MCNC: 7.5 GM/DL (ref 5.8–7.6)
RBC # BLD AUTO: 5.36 X10(6)/MCL (ref 4.7–6.1)
SODIUM SERPL-SCNC: 141 MMOL/L (ref 136–145)
WBC # BLD AUTO: 7.03 X10(3)/MCL (ref 4.5–11.5)

## 2025-08-22 PROCEDURE — 99999 PR PBB SHADOW E&M-EST. PATIENT-LVL IV: CPT | Mod: PBBFAC,,,

## 2025-08-22 PROCEDURE — 99214 OFFICE O/P EST MOD 30 MIN: CPT | Mod: PBBFAC

## 2025-08-22 PROCEDURE — 83735 ASSAY OF MAGNESIUM: CPT

## 2025-08-22 PROCEDURE — 85025 COMPLETE CBC W/AUTO DIFF WBC: CPT

## 2025-08-22 PROCEDURE — 80053 COMPREHEN METABOLIC PANEL: CPT

## 2025-08-22 PROCEDURE — 36415 COLL VENOUS BLD VENIPUNCTURE: CPT

## 2025-08-22 RX ORDER — AMOXICILLIN AND CLAVULANATE POTASSIUM 875; 125 MG/1; MG/1
1 TABLET, FILM COATED ORAL 2 TIMES DAILY
COMMUNITY
Start: 2025-08-14

## (undated) DEVICE — SYR 10CC LUER LOCK

## (undated) DEVICE — SUT CTD VICRYL 3-0 CR/SH

## (undated) DEVICE — GLOVE PROTEXIS BLUE LATEX 7

## (undated) DEVICE — SUT GUT PL. 4-0 27 FS-2

## (undated) DEVICE — NDL HYPO POLYPR STD 26G 1.5IN

## (undated) DEVICE — DRAPE INCISE IOBAN 2 23X17IN

## (undated) DEVICE — Device

## (undated) DEVICE — PAD ELECTROSURGICAL SPL W/CORD

## (undated) DEVICE — ADHESIVE DERMABOND ADVANCED

## (undated) DEVICE — CHLORAPREP W TINT 26ML APPL

## (undated) DEVICE — CLIPPER BLADE MOD 4406 (CAREF)

## (undated) DEVICE — GLOVE PROTEXIS HYDROGEL SZ6.5

## (undated) DEVICE — DRESSING TRANS 4X4 TEGADERM